# Patient Record
Sex: FEMALE | Race: WHITE | NOT HISPANIC OR LATINO | URBAN - METROPOLITAN AREA
[De-identification: names, ages, dates, MRNs, and addresses within clinical notes are randomized per-mention and may not be internally consistent; named-entity substitution may affect disease eponyms.]

---

## 2023-12-29 ENCOUNTER — INPATIENT (INPATIENT)
Facility: HOSPITAL | Age: 83
LOS: 5 days | Discharge: NURSING, REHAB OR LONG-TERM CARE | DRG: 556 | End: 2024-01-04
Attending: STUDENT IN AN ORGANIZED HEALTH CARE EDUCATION/TRAINING PROGRAM | Admitting: STUDENT IN AN ORGANIZED HEALTH CARE EDUCATION/TRAINING PROGRAM
Payer: MEDICARE

## 2023-12-29 VITALS
TEMPERATURE: 97 F | RESPIRATION RATE: 18 BRPM | SYSTOLIC BLOOD PRESSURE: 163 MMHG | DIASTOLIC BLOOD PRESSURE: 83 MMHG | HEART RATE: 88 BPM | WEIGHT: 147.93 LBS | OXYGEN SATURATION: 99 %

## 2023-12-29 DIAGNOSIS — Z29.9 ENCOUNTER FOR PROPHYLACTIC MEASURES, UNSPECIFIED: ICD-10-CM

## 2023-12-29 DIAGNOSIS — E78.5 HYPERLIPIDEMIA, UNSPECIFIED: ICD-10-CM

## 2023-12-29 DIAGNOSIS — R09.89 OTHER SPECIFIED SYMPTOMS AND SIGNS INVOLVING THE CIRCULATORY AND RESPIRATORY SYSTEMS: ICD-10-CM
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
## 2023-12-29 DIAGNOSIS — E03.9 HYPOTHYROIDISM, UNSPECIFIED: ICD-10-CM

## 2023-12-29 DIAGNOSIS — Z98.890 OTHER SPECIFIED POSTPROCEDURAL STATES: Chronic | ICD-10-CM

## 2023-12-29 DIAGNOSIS — I10 ESSENTIAL (PRIMARY) HYPERTENSION: ICD-10-CM

## 2023-12-29 DIAGNOSIS — R53.1 WEAKNESS: ICD-10-CM

## 2023-12-29 DIAGNOSIS — E11.9 TYPE 2 DIABETES MELLITUS WITHOUT COMPLICATIONS: ICD-10-CM

## 2023-12-29 LAB
ALBUMIN SERPL ELPH-MCNC: 3.8 G/DL — SIGNIFICANT CHANGE UP (ref 3.3–5)
ALBUMIN SERPL ELPH-MCNC: 3.8 G/DL — SIGNIFICANT CHANGE UP (ref 3.3–5)
ALP SERPL-CCNC: 94 U/L — SIGNIFICANT CHANGE UP (ref 40–120)
ALP SERPL-CCNC: 94 U/L — SIGNIFICANT CHANGE UP (ref 40–120)
ALT FLD-CCNC: 17 U/L — SIGNIFICANT CHANGE UP (ref 10–45)
ALT FLD-CCNC: 17 U/L — SIGNIFICANT CHANGE UP (ref 10–45)
ANION GAP SERPL CALC-SCNC: 13 MMOL/L — SIGNIFICANT CHANGE UP (ref 5–17)
ANION GAP SERPL CALC-SCNC: 13 MMOL/L — SIGNIFICANT CHANGE UP (ref 5–17)
APTT BLD: 29.6 SEC — SIGNIFICANT CHANGE UP (ref 24.5–35.6)
APTT BLD: 29.6 SEC — SIGNIFICANT CHANGE UP (ref 24.5–35.6)
AST SERPL-CCNC: 31 U/L — SIGNIFICANT CHANGE UP (ref 10–40)
AST SERPL-CCNC: 31 U/L — SIGNIFICANT CHANGE UP (ref 10–40)
BASOPHILS # BLD AUTO: 0.04 K/UL — SIGNIFICANT CHANGE UP (ref 0–0.2)
BASOPHILS # BLD AUTO: 0.04 K/UL — SIGNIFICANT CHANGE UP (ref 0–0.2)
BASOPHILS NFR BLD AUTO: 0.4 % — SIGNIFICANT CHANGE UP (ref 0–2)
BASOPHILS NFR BLD AUTO: 0.4 % — SIGNIFICANT CHANGE UP (ref 0–2)
BILIRUB SERPL-MCNC: 0.3 MG/DL — SIGNIFICANT CHANGE UP (ref 0.2–1.2)
BILIRUB SERPL-MCNC: 0.3 MG/DL — SIGNIFICANT CHANGE UP (ref 0.2–1.2)
BUN SERPL-MCNC: 24 MG/DL — HIGH (ref 7–23)
BUN SERPL-MCNC: 24 MG/DL — HIGH (ref 7–23)
CALCIUM SERPL-MCNC: 9.8 MG/DL — SIGNIFICANT CHANGE UP (ref 8.4–10.5)
CALCIUM SERPL-MCNC: 9.8 MG/DL — SIGNIFICANT CHANGE UP (ref 8.4–10.5)
CHLORIDE SERPL-SCNC: 106 MMOL/L — SIGNIFICANT CHANGE UP (ref 96–108)
CHLORIDE SERPL-SCNC: 106 MMOL/L — SIGNIFICANT CHANGE UP (ref 96–108)
CO2 SERPL-SCNC: 20 MMOL/L — LOW (ref 22–31)
CO2 SERPL-SCNC: 20 MMOL/L — LOW (ref 22–31)
CREAT SERPL-MCNC: 0.78 MG/DL — SIGNIFICANT CHANGE UP (ref 0.5–1.3)
CREAT SERPL-MCNC: 0.78 MG/DL — SIGNIFICANT CHANGE UP (ref 0.5–1.3)
EGFR: 76 ML/MIN/1.73M2 — SIGNIFICANT CHANGE UP
EGFR: 76 ML/MIN/1.73M2 — SIGNIFICANT CHANGE UP
EOSINOPHIL # BLD AUTO: 0.14 K/UL — SIGNIFICANT CHANGE UP (ref 0–0.5)
EOSINOPHIL # BLD AUTO: 0.14 K/UL — SIGNIFICANT CHANGE UP (ref 0–0.5)
EOSINOPHIL NFR BLD AUTO: 1.3 % — SIGNIFICANT CHANGE UP (ref 0–6)
EOSINOPHIL NFR BLD AUTO: 1.3 % — SIGNIFICANT CHANGE UP (ref 0–6)
GLUCOSE SERPL-MCNC: 138 MG/DL — HIGH (ref 70–99)
GLUCOSE SERPL-MCNC: 138 MG/DL — HIGH (ref 70–99)
HCT VFR BLD CALC: 35 % — SIGNIFICANT CHANGE UP (ref 34.5–45)
HCT VFR BLD CALC: 35 % — SIGNIFICANT CHANGE UP (ref 34.5–45)
HGB BLD-MCNC: 11.6 G/DL — SIGNIFICANT CHANGE UP (ref 11.5–15.5)
HGB BLD-MCNC: 11.6 G/DL — SIGNIFICANT CHANGE UP (ref 11.5–15.5)
IMM GRANULOCYTES NFR BLD AUTO: 0.6 % — SIGNIFICANT CHANGE UP (ref 0–0.9)
IMM GRANULOCYTES NFR BLD AUTO: 0.6 % — SIGNIFICANT CHANGE UP (ref 0–0.9)
INR BLD: 1.06 RATIO — SIGNIFICANT CHANGE UP (ref 0.85–1.18)
INR BLD: 1.06 RATIO — SIGNIFICANT CHANGE UP (ref 0.85–1.18)
LYMPHOCYTES # BLD AUTO: 1.35 K/UL — SIGNIFICANT CHANGE UP (ref 1–3.3)
LYMPHOCYTES # BLD AUTO: 1.35 K/UL — SIGNIFICANT CHANGE UP (ref 1–3.3)
LYMPHOCYTES # BLD AUTO: 12.9 % — LOW (ref 13–44)
LYMPHOCYTES # BLD AUTO: 12.9 % — LOW (ref 13–44)
MCHC RBC-ENTMCNC: 29.6 PG — SIGNIFICANT CHANGE UP (ref 27–34)
MCHC RBC-ENTMCNC: 29.6 PG — SIGNIFICANT CHANGE UP (ref 27–34)
MCHC RBC-ENTMCNC: 33.1 GM/DL — SIGNIFICANT CHANGE UP (ref 32–36)
MCHC RBC-ENTMCNC: 33.1 GM/DL — SIGNIFICANT CHANGE UP (ref 32–36)
MCV RBC AUTO: 89.3 FL — SIGNIFICANT CHANGE UP (ref 80–100)
MCV RBC AUTO: 89.3 FL — SIGNIFICANT CHANGE UP (ref 80–100)
MONOCYTES # BLD AUTO: 0.94 K/UL — HIGH (ref 0–0.9)
MONOCYTES # BLD AUTO: 0.94 K/UL — HIGH (ref 0–0.9)
MONOCYTES NFR BLD AUTO: 9 % — SIGNIFICANT CHANGE UP (ref 2–14)
MONOCYTES NFR BLD AUTO: 9 % — SIGNIFICANT CHANGE UP (ref 2–14)
NEUTROPHILS # BLD AUTO: 7.97 K/UL — HIGH (ref 1.8–7.4)
NEUTROPHILS # BLD AUTO: 7.97 K/UL — HIGH (ref 1.8–7.4)
NEUTROPHILS NFR BLD AUTO: 75.8 % — SIGNIFICANT CHANGE UP (ref 43–77)
NEUTROPHILS NFR BLD AUTO: 75.8 % — SIGNIFICANT CHANGE UP (ref 43–77)
NRBC # BLD: 0 /100 WBCS — SIGNIFICANT CHANGE UP (ref 0–0)
NRBC # BLD: 0 /100 WBCS — SIGNIFICANT CHANGE UP (ref 0–0)
PLATELET # BLD AUTO: 273 K/UL — SIGNIFICANT CHANGE UP (ref 150–400)
PLATELET # BLD AUTO: 273 K/UL — SIGNIFICANT CHANGE UP (ref 150–400)
POTASSIUM SERPL-MCNC: 4.6 MMOL/L — SIGNIFICANT CHANGE UP (ref 3.5–5.3)
POTASSIUM SERPL-MCNC: 4.6 MMOL/L — SIGNIFICANT CHANGE UP (ref 3.5–5.3)
POTASSIUM SERPL-SCNC: 4.6 MMOL/L — SIGNIFICANT CHANGE UP (ref 3.5–5.3)
POTASSIUM SERPL-SCNC: 4.6 MMOL/L — SIGNIFICANT CHANGE UP (ref 3.5–5.3)
PROT SERPL-MCNC: 7.2 G/DL — SIGNIFICANT CHANGE UP (ref 6–8.3)
PROT SERPL-MCNC: 7.2 G/DL — SIGNIFICANT CHANGE UP (ref 6–8.3)
PROTHROM AB SERPL-ACNC: 11.6 SEC — SIGNIFICANT CHANGE UP (ref 9.5–13)
PROTHROM AB SERPL-ACNC: 11.6 SEC — SIGNIFICANT CHANGE UP (ref 9.5–13)
RBC # BLD: 3.92 M/UL — SIGNIFICANT CHANGE UP (ref 3.8–5.2)
RBC # BLD: 3.92 M/UL — SIGNIFICANT CHANGE UP (ref 3.8–5.2)
RBC # FLD: 14.3 % — SIGNIFICANT CHANGE UP (ref 10.3–14.5)
RBC # FLD: 14.3 % — SIGNIFICANT CHANGE UP (ref 10.3–14.5)
SODIUM SERPL-SCNC: 139 MMOL/L — SIGNIFICANT CHANGE UP (ref 135–145)
SODIUM SERPL-SCNC: 139 MMOL/L — SIGNIFICANT CHANGE UP (ref 135–145)
TROPONIN T, HIGH SENSITIVITY RESULT: 30 NG/L — SIGNIFICANT CHANGE UP (ref 0–51)
TROPONIN T, HIGH SENSITIVITY RESULT: 30 NG/L — SIGNIFICANT CHANGE UP (ref 0–51)
TROPONIN T, HIGH SENSITIVITY RESULT: 37 NG/L — SIGNIFICANT CHANGE UP (ref 0–51)
TROPONIN T, HIGH SENSITIVITY RESULT: 37 NG/L — SIGNIFICANT CHANGE UP (ref 0–51)
TSH SERPL-MCNC: 5.64 UIU/ML — HIGH (ref 0.27–4.2)
TSH SERPL-MCNC: 5.64 UIU/ML — HIGH (ref 0.27–4.2)
WBC # BLD: 10.5 K/UL — SIGNIFICANT CHANGE UP (ref 3.8–10.5)
WBC # BLD: 10.5 K/UL — SIGNIFICANT CHANGE UP (ref 3.8–10.5)
WBC # FLD AUTO: 10.5 K/UL — SIGNIFICANT CHANGE UP (ref 3.8–10.5)
WBC # FLD AUTO: 10.5 K/UL — SIGNIFICANT CHANGE UP (ref 3.8–10.5)

## 2023-12-29 PROCEDURE — 70450 CT HEAD/BRAIN W/O DYE: CPT | Mod: 26,MA

## 2023-12-29 PROCEDURE — 99285 EMERGENCY DEPT VISIT HI MDM: CPT

## 2023-12-29 PROCEDURE — 72125 CT NECK SPINE W/O DYE: CPT | Mod: 26,MA

## 2023-12-29 PROCEDURE — 71045 X-RAY EXAM CHEST 1 VIEW: CPT | Mod: 26

## 2023-12-29 PROCEDURE — 72170 X-RAY EXAM OF PELVIS: CPT | Mod: 26

## 2023-12-29 PROCEDURE — 99223 1ST HOSP IP/OBS HIGH 75: CPT

## 2023-12-29 PROCEDURE — 73562 X-RAY EXAM OF KNEE 3: CPT | Mod: 26,50

## 2023-12-29 PROCEDURE — 93010 ELECTROCARDIOGRAM REPORT: CPT

## 2023-12-29 RX ORDER — INSULIN LISPRO 100/ML
VIAL (ML) SUBCUTANEOUS
Refills: 0 | Status: DISCONTINUED | OUTPATIENT
Start: 2023-12-29 | End: 2024-01-04

## 2023-12-29 RX ORDER — CLOPIDOGREL BISULFATE 75 MG/1
1 TABLET, FILM COATED ORAL
Refills: 0 | DISCHARGE

## 2023-12-29 RX ORDER — SODIUM CHLORIDE 9 MG/ML
1000 INJECTION, SOLUTION INTRAVENOUS
Refills: 0 | Status: DISCONTINUED | OUTPATIENT
Start: 2023-12-29 | End: 2024-01-04

## 2023-12-29 RX ORDER — INSULIN LISPRO 100/ML
VIAL (ML) SUBCUTANEOUS AT BEDTIME
Refills: 0 | Status: DISCONTINUED | OUTPATIENT
Start: 2023-12-29 | End: 2024-01-04

## 2023-12-29 RX ORDER — ACETAMINOPHEN 500 MG
650 TABLET ORAL EVERY 6 HOURS
Refills: 0 | Status: DISCONTINUED | OUTPATIENT
Start: 2023-12-29 | End: 2024-01-04

## 2023-12-29 RX ORDER — SODIUM CHLORIDE 9 MG/ML
1000 INJECTION INTRAMUSCULAR; INTRAVENOUS; SUBCUTANEOUS ONCE
Refills: 0 | Status: COMPLETED | OUTPATIENT
Start: 2023-12-29 | End: 2023-12-29

## 2023-12-29 RX ORDER — DEXTROSE 50 % IN WATER 50 %
25 SYRINGE (ML) INTRAVENOUS ONCE
Refills: 0 | Status: DISCONTINUED | OUTPATIENT
Start: 2023-12-29 | End: 2024-01-04

## 2023-12-29 RX ORDER — LANOLIN ALCOHOL/MO/W.PET/CERES
3 CREAM (GRAM) TOPICAL AT BEDTIME
Refills: 0 | Status: DISCONTINUED | OUTPATIENT
Start: 2023-12-29 | End: 2023-12-30

## 2023-12-29 RX ORDER — DEXTROSE 50 % IN WATER 50 %
15 SYRINGE (ML) INTRAVENOUS ONCE
Refills: 0 | Status: DISCONTINUED | OUTPATIENT
Start: 2023-12-29 | End: 2024-01-04

## 2023-12-29 RX ORDER — METFORMIN HYDROCHLORIDE 850 MG/1
1 TABLET ORAL
Refills: 0 | DISCHARGE

## 2023-12-29 RX ORDER — DEXTROSE 50 % IN WATER 50 %
12.5 SYRINGE (ML) INTRAVENOUS ONCE
Refills: 0 | Status: DISCONTINUED | OUTPATIENT
Start: 2023-12-29 | End: 2024-01-04

## 2023-12-29 RX ORDER — GLUCAGON INJECTION, SOLUTION 0.5 MG/.1ML
1 INJECTION, SOLUTION SUBCUTANEOUS ONCE
Refills: 0 | Status: DISCONTINUED | OUTPATIENT
Start: 2023-12-29 | End: 2024-01-04

## 2023-12-29 RX ADMIN — SODIUM CHLORIDE 1000 MILLILITER(S): 9 INJECTION INTRAMUSCULAR; INTRAVENOUS; SUBCUTANEOUS at 21:59

## 2023-12-29 NOTE — H&P ADULT - NSHPSOCIALHISTORY_GEN_ALL_CORE
Denies smoking or significant ETOH. Lives alone. Uses cane at home, has walker but rarely uses. Multiple nieces and friends in area, son lives in [place]

## 2023-12-29 NOTE — ED PROVIDER NOTE - OBJECTIVE STATEMENT
83-year-old female with past medical history of hypertension, diabetes, hyperlipidemia presents emerged department complaining of difficulty ambulating over the past week.  Patient notes that she has had trouble walking as well as getting up from a chair or bed.  She notes that this is not her baseline and typically can ambulate with assistance of a cane.  Last week she also had a fall reporting that she landed on her buttock area.  She endorses that yesterday also was lying in bed and she slipped hitting her head on a night stand.  She reports that she just feels generally more weak than her baseline. She endorses taking Plavix but is unsure why. Today also had dizziness as well.  She denies headaches, chest pain, shortness of breath, abdominal pain nausea or vomiting.

## 2023-12-29 NOTE — H&P ADULT - HISTORY OF PRESENT ILLNESS
83F w/ hx of DM2, HTN, HLD, OA p/w weakness and inability to ambulate. Pt endorses ~10 days ago she went out with niece for most of day and this was significantly more exertion than she was used to. Told niece not to call that night since they were together. At some point at dining table she tried to get up that night and fell to floor. Was on floor for almost 1 day as she was unable to  phone calls of niece and multiple friends. She was helped back up. After that patient had difficulty getting up when sitting in cough but able to stand when in chair. Has wooden armrest on couch and hit head on this as she was adjusting herself on couch which resulted in her forehead bruise. Nieces became concerned over the course of the week at pt's inability to get off couch. Had ultimatum that if patient did not get medical evaluation today they would call police to force her to hospital. When fire department came today to help, she could no longer stand from chair and realized she was deteriorating and needed to get evaluation. Denies any fevers, chills, cough, SOB, chest pain, palpitations, LOC, abdominal pain, dysuria or urinary frequency. Endorses chronic episodes of diarrhea but not recently. Not sure why she is on plavix but denies cardiac history.    In ER; Given NS 1L

## 2023-12-29 NOTE — ED PROVIDER NOTE - NS ED ATTENDING STATEMENT MOD
I have seen and examined this patient and fully participated in the care of this patient as the teaching attending.  The service was shared with the KENNEDI.  I reviewed and verified the documentation and independently performed the documented:

## 2023-12-29 NOTE — H&P ADULT - PROBLEM SELECTOR PLAN 6
-Cont. levothyroxine  -TSH slightly elevated, will order Free T3 and T4  -Need med rec [phone] [facility] -Cont. levothyroxine  -TSH slightly elevated, will order Free T3 and T4  -Need med rec [phone] [facility]

## 2023-12-29 NOTE — H&P ADULT - NSHPPHYSICALEXAM_GEN_ALL_CORE
Vital Signs Last 24 Hrs  T(C): 36.7 (12-29-23 @ 21:59), Max: 36.7 (12-29-23 @ 21:59)  T(F): 98 (12-29-23 @ 21:59), Max: 98 (12-29-23 @ 21:59)  HR: 80 (12-29-23 @ 21:59) (80 - 88)  BP: 130/80 (12-29-23 @ 21:59) (130/80 - 163/83)  BP(mean): --  RR: 16 (12-29-23 @ 21:59) (16 - 18)  SpO2: 99% (12-29-23 @ 21:59) (99% - 99%)

## 2023-12-29 NOTE — ED PROVIDER NOTE - PHYSICAL EXAMINATION
CONSTITUTIONAL: Patient is awake, alert and oriented x 3. Patient is well appearing and in no acute distress  HEAD: NCAT  NECK: Hematoma to the right forehead otherwise NCAT, neck supple w/ FROM  LUNGS: CTA b/l, no wheezing or rales. No ttp to the anterior, lateral or posterior chest wall   HEART: RRR.+S1S2 no murmurs  ABDOMEN: Soft, non-distended, nttp, no rebound or guarding  EXTREMITY: FROM upper and lower ext b/l. No focal bony ttp to the UE/LE b/l. Pelvis stable without bony ttp. No midline cervical, thoracic or lumbar ttp   SKIN: with no rash or lesions  NEURO: No focal deficits

## 2023-12-29 NOTE — ED ADULT NURSE NOTE - OBJECTIVE STATEMENT
82 yo f arrived to the ed c/o dizziness and difficulty ambulating x 1 week; had a fall 1 week ago; coming from home 84 yo f arrived to the ed c/o dizziness and difficulty ambulating x 1 week; had a fall 1 week ago; coming from home 83-year-old female with past medical history of hypertension, diabetes, hyperlipidemia presents emerged department complaining of difficulty ambulating over the past week.  Patient notes that she has had trouble walking as well as getting up from a chair or bed.  She notes that this is not her baseline and typically can ambulate with assistance of a cane.  Last week she also had a fall reporting that she landed on her buttock area.  She endorses that yesterday also was lying in bed and she slipped hitting her head on a night stand.  She reports that she just feels generally more weak than her baseline. She endorses taking Plavix but is unsure why. Today also had dizziness as well.  She denies headaches, chest pain, shortness of breath, abdominal pain nausea or vomiting.

## 2023-12-29 NOTE — H&P ADULT - ASSESSMENT
83F w/ hx of DM2, HTN, HLD, OA p/w weakness and inability to ambulate after fall at home 83F w/ hx of DM2, HTN, HLD, OA p/w weakness and inability to ambulate after fall at home likely with some degree of resolving rhabdo and possible deconditioning

## 2023-12-29 NOTE — H&P ADULT - PROBLEM SELECTOR PLAN 1
Patient with persistent weakness and difficulty standing/ambulating since fall and prolonged downtime. Possibly deconditioning but will eval for possible metabolic factors.  -Falls precautions  -PT consult  -Send U/A, UCx  -F/u T3, T4  -B12  -Given L knee effusion and plan for Lovenox for DVT prophylaxis, will order CT L knee to r/o hemarthrosis  -D/C planning to [facility] vs home

## 2023-12-29 NOTE — ED PROVIDER NOTE - IV ALTEPLASE EXCL ABS HIDDEN
Impression: S/P Cataract Extraction by phacoemulsification with IOL placement; ORA OS - 7 Days. Presence of intraocular lens  Z96.1. Excellent post op course   Post operative instructions reviewed - Condition is improving - Plan: Use AFT 1-4x daily OU
all restrictions lifted RTC 3wk PO3
show

## 2023-12-29 NOTE — H&P ADULT - PROBLEM SELECTOR PLAN 4
-Trend BP  -Pt on nebivolol?  -Need med rec [phone] [facility] -Trend BP  -Pt on nebivolol?  -Need med rec [phone] [facility] [place]

## 2023-12-29 NOTE — H&P ADULT - PROBLEM SELECTOR PLAN 2
Unclear etiology of fall >1 week ago. Seems to have vasovagal component vs mechanical. Pt denies cardiac hx but not clear why pt on plavix and nebivolol  -Falls precautions  -PT consult  -Orthostatics  -Repeat CPK in AM, if downtrending can likely stop trending  -TTE Unclear etiology of fall >1 week ago. Seems to have vasovagal component vs mechanical. Pt denies cardiac hx but not clear why pt on plavix and nebivolol. CTH w/o acute findings. Plain films w/o fracture. Note L knee effusion  -Falls precautions  -PT consult  -Orthostatics  -Repeat CPK in AM, if downtrending can likely stop trending  -TTE  -Cont. plavix for now  -Need med rec [phone] [facility] Unclear etiology of fall >1 week ago. Seems to have vasovagal component vs mechanical. Pt denies cardiac hx but not clear why pt on plavix and nebivolol. CTH w/o acute findings. Plain films w/o fracture. Note L knee effusion  -Falls precautions  -PT consult  -Orthostatics  -Repeat CPK in AM, if downtrending can likely stop trending  -TTE  -Cont. plavix for now  -Need med rec [phone] [facility]

## 2023-12-29 NOTE — H&P ADULT - NSHPLABSRESULTS_GEN_ALL_CORE
I have personally reviewed the labs, imaging and ekg. EKG with NSR HR82 QTc 471 non-specific ST segment findings, CXR w/o focal consolidations.

## 2023-12-29 NOTE — H&P ADULT - PROBLEM SELECTOR PLAN 5
-Cont. atorvastatin  -Need med rec [phone] [facility] -Cont. atorvastatin  -Need med rec [phone] [facility]

## 2023-12-29 NOTE — ED ADULT NURSE NOTE - NSFALLRISKINTERV_ED_ALL_ED
Assistance OOB with selected safe patient handling equipment if applicable/Assistance with ambulation/Communicate fall risk and risk factors to all staff, patient, and family/Encourage patient to sit up slowly, dangle for a short time, stand at bedside before walking/Monitor gait and stability/Orthostatic vital signs/Provide visual cue: yellow wristband, yellow gown, etc/Reinforce activity limits and safety measures with patient and family/Call bell, personal items and telephone in reach/Instruct patient to call for assistance before getting out of bed/chair/stretcher/Non-slip footwear applied when patient is off stretcher/[place] to call system/Physically safe environment - no spills, clutter or unnecessary equipment/Purposeful Proactive Rounding/Room/bathroom lighting operational, light cord in reach Assistance OOB with selected safe patient handling equipment if applicable/Assistance with ambulation/Communicate fall risk and risk factors to all staff, patient, and family/Encourage patient to sit up slowly, dangle for a short time, stand at bedside before walking/Monitor gait and stability/Orthostatic vital signs/Provide visual cue: yellow wristband, yellow gown, etc/Reinforce activity limits and safety measures with patient and family/Call bell, personal items and telephone in reach/Instruct patient to call for assistance before getting out of bed/chair/stretcher/Non-slip footwear applied when patient is off stretcher/[place] to call system/Physically safe environment - no spills, clutter or unnecessary equipment/Purposeful Proactive Rounding/Room/bathroom lighting operational, light cord in reach

## 2023-12-29 NOTE — H&P ADULT - TIME BILLING
Need to interview and examine patient,  provide counseling, coordinate care, place orders, document, personally review imaging, ekg and review prior medical records

## 2023-12-30 LAB
A1C WITH ESTIMATED AVERAGE GLUCOSE RESULT: 6.2 % — HIGH (ref 4–5.6)
A1C WITH ESTIMATED AVERAGE GLUCOSE RESULT: 6.2 % — HIGH (ref 4–5.6)
ALBUMIN SERPL ELPH-MCNC: 3.6 G/DL — SIGNIFICANT CHANGE UP (ref 3.3–5)
ALBUMIN SERPL ELPH-MCNC: 3.6 G/DL — SIGNIFICANT CHANGE UP (ref 3.3–5)
ALP SERPL-CCNC: 88 U/L — SIGNIFICANT CHANGE UP (ref 40–120)
ALP SERPL-CCNC: 88 U/L — SIGNIFICANT CHANGE UP (ref 40–120)
ALT FLD-CCNC: 12 U/L — SIGNIFICANT CHANGE UP (ref 10–45)
ALT FLD-CCNC: 12 U/L — SIGNIFICANT CHANGE UP (ref 10–45)
ANION GAP SERPL CALC-SCNC: 9 MMOL/L — SIGNIFICANT CHANGE UP (ref 5–17)
ANION GAP SERPL CALC-SCNC: 9 MMOL/L — SIGNIFICANT CHANGE UP (ref 5–17)
APPEARANCE UR: ABNORMAL
APPEARANCE UR: ABNORMAL
AST SERPL-CCNC: 18 U/L — SIGNIFICANT CHANGE UP (ref 10–40)
AST SERPL-CCNC: 18 U/L — SIGNIFICANT CHANGE UP (ref 10–40)
BACTERIA # UR AUTO: ABNORMAL /HPF
BACTERIA # UR AUTO: ABNORMAL /HPF
BASOPHILS # BLD AUTO: 0.04 K/UL — SIGNIFICANT CHANGE UP (ref 0–0.2)
BASOPHILS # BLD AUTO: 0.04 K/UL — SIGNIFICANT CHANGE UP (ref 0–0.2)
BASOPHILS NFR BLD AUTO: 0.6 % — SIGNIFICANT CHANGE UP (ref 0–2)
BASOPHILS NFR BLD AUTO: 0.6 % — SIGNIFICANT CHANGE UP (ref 0–2)
BILIRUB SERPL-MCNC: 0.4 MG/DL — SIGNIFICANT CHANGE UP (ref 0.2–1.2)
BILIRUB SERPL-MCNC: 0.4 MG/DL — SIGNIFICANT CHANGE UP (ref 0.2–1.2)
BILIRUB UR-MCNC: NEGATIVE — SIGNIFICANT CHANGE UP
BILIRUB UR-MCNC: NEGATIVE — SIGNIFICANT CHANGE UP
BUN SERPL-MCNC: 18 MG/DL — SIGNIFICANT CHANGE UP (ref 7–23)
BUN SERPL-MCNC: 18 MG/DL — SIGNIFICANT CHANGE UP (ref 7–23)
CALCIUM SERPL-MCNC: 9.1 MG/DL — SIGNIFICANT CHANGE UP (ref 8.4–10.5)
CALCIUM SERPL-MCNC: 9.1 MG/DL — SIGNIFICANT CHANGE UP (ref 8.4–10.5)
CAST: 1 /LPF — SIGNIFICANT CHANGE UP (ref 0–4)
CAST: 1 /LPF — SIGNIFICANT CHANGE UP (ref 0–4)
CHLORIDE SERPL-SCNC: 109 MMOL/L — HIGH (ref 96–108)
CHLORIDE SERPL-SCNC: 109 MMOL/L — HIGH (ref 96–108)
CK SERPL-CCNC: 747 U/L — HIGH (ref 25–170)
CK SERPL-CCNC: 747 U/L — HIGH (ref 25–170)
CO2 SERPL-SCNC: 24 MMOL/L — SIGNIFICANT CHANGE UP (ref 22–31)
CO2 SERPL-SCNC: 24 MMOL/L — SIGNIFICANT CHANGE UP (ref 22–31)
COLOR SPEC: YELLOW — SIGNIFICANT CHANGE UP
COLOR SPEC: YELLOW — SIGNIFICANT CHANGE UP
CREAT SERPL-MCNC: 0.94 MG/DL — SIGNIFICANT CHANGE UP (ref 0.5–1.3)
CREAT SERPL-MCNC: 0.94 MG/DL — SIGNIFICANT CHANGE UP (ref 0.5–1.3)
DIFF PNL FLD: NEGATIVE — SIGNIFICANT CHANGE UP
DIFF PNL FLD: NEGATIVE — SIGNIFICANT CHANGE UP
EGFR: 61 ML/MIN/1.73M2 — SIGNIFICANT CHANGE UP
EGFR: 61 ML/MIN/1.73M2 — SIGNIFICANT CHANGE UP
EOSINOPHIL # BLD AUTO: 0.39 K/UL — SIGNIFICANT CHANGE UP (ref 0–0.5)
EOSINOPHIL # BLD AUTO: 0.39 K/UL — SIGNIFICANT CHANGE UP (ref 0–0.5)
EOSINOPHIL NFR BLD AUTO: 5.4 % — SIGNIFICANT CHANGE UP (ref 0–6)
EOSINOPHIL NFR BLD AUTO: 5.4 % — SIGNIFICANT CHANGE UP (ref 0–6)
ESTIMATED AVERAGE GLUCOSE: 131 MG/DL — HIGH (ref 68–114)
ESTIMATED AVERAGE GLUCOSE: 131 MG/DL — HIGH (ref 68–114)
GLUCOSE BLDC GLUCOMTR-MCNC: 136 MG/DL — HIGH (ref 70–99)
GLUCOSE BLDC GLUCOMTR-MCNC: 136 MG/DL — HIGH (ref 70–99)
GLUCOSE BLDC GLUCOMTR-MCNC: 148 MG/DL — HIGH (ref 70–99)
GLUCOSE BLDC GLUCOMTR-MCNC: 148 MG/DL — HIGH (ref 70–99)
GLUCOSE BLDC GLUCOMTR-MCNC: 152 MG/DL — HIGH (ref 70–99)
GLUCOSE BLDC GLUCOMTR-MCNC: 152 MG/DL — HIGH (ref 70–99)
GLUCOSE BLDC GLUCOMTR-MCNC: 174 MG/DL — HIGH (ref 70–99)
GLUCOSE BLDC GLUCOMTR-MCNC: 174 MG/DL — HIGH (ref 70–99)
GLUCOSE BLDC GLUCOMTR-MCNC: 182 MG/DL — HIGH (ref 70–99)
GLUCOSE BLDC GLUCOMTR-MCNC: 182 MG/DL — HIGH (ref 70–99)
GLUCOSE SERPL-MCNC: 125 MG/DL — HIGH (ref 70–99)
GLUCOSE SERPL-MCNC: 125 MG/DL — HIGH (ref 70–99)
GLUCOSE UR QL: NEGATIVE MG/DL — SIGNIFICANT CHANGE UP
GLUCOSE UR QL: NEGATIVE MG/DL — SIGNIFICANT CHANGE UP
HCT VFR BLD CALC: 35.5 % — SIGNIFICANT CHANGE UP (ref 34.5–45)
HCT VFR BLD CALC: 35.5 % — SIGNIFICANT CHANGE UP (ref 34.5–45)
HGB BLD-MCNC: 11.5 G/DL — SIGNIFICANT CHANGE UP (ref 11.5–15.5)
HGB BLD-MCNC: 11.5 G/DL — SIGNIFICANT CHANGE UP (ref 11.5–15.5)
IMM GRANULOCYTES NFR BLD AUTO: 0.6 % — SIGNIFICANT CHANGE UP (ref 0–0.9)
IMM GRANULOCYTES NFR BLD AUTO: 0.6 % — SIGNIFICANT CHANGE UP (ref 0–0.9)
KETONES UR-MCNC: ABNORMAL MG/DL
KETONES UR-MCNC: ABNORMAL MG/DL
LEUKOCYTE ESTERASE UR-ACNC: ABNORMAL
LEUKOCYTE ESTERASE UR-ACNC: ABNORMAL
LYMPHOCYTES # BLD AUTO: 1.97 K/UL — SIGNIFICANT CHANGE UP (ref 1–3.3)
LYMPHOCYTES # BLD AUTO: 1.97 K/UL — SIGNIFICANT CHANGE UP (ref 1–3.3)
LYMPHOCYTES # BLD AUTO: 27.3 % — SIGNIFICANT CHANGE UP (ref 13–44)
LYMPHOCYTES # BLD AUTO: 27.3 % — SIGNIFICANT CHANGE UP (ref 13–44)
MAGNESIUM SERPL-MCNC: 2 MG/DL — SIGNIFICANT CHANGE UP (ref 1.6–2.6)
MAGNESIUM SERPL-MCNC: 2 MG/DL — SIGNIFICANT CHANGE UP (ref 1.6–2.6)
MCHC RBC-ENTMCNC: 29.5 PG — SIGNIFICANT CHANGE UP (ref 27–34)
MCHC RBC-ENTMCNC: 29.5 PG — SIGNIFICANT CHANGE UP (ref 27–34)
MCHC RBC-ENTMCNC: 32.4 GM/DL — SIGNIFICANT CHANGE UP (ref 32–36)
MCHC RBC-ENTMCNC: 32.4 GM/DL — SIGNIFICANT CHANGE UP (ref 32–36)
MCV RBC AUTO: 91 FL — SIGNIFICANT CHANGE UP (ref 80–100)
MCV RBC AUTO: 91 FL — SIGNIFICANT CHANGE UP (ref 80–100)
MONOCYTES # BLD AUTO: 0.78 K/UL — SIGNIFICANT CHANGE UP (ref 0–0.9)
MONOCYTES # BLD AUTO: 0.78 K/UL — SIGNIFICANT CHANGE UP (ref 0–0.9)
MONOCYTES NFR BLD AUTO: 10.8 % — SIGNIFICANT CHANGE UP (ref 2–14)
MONOCYTES NFR BLD AUTO: 10.8 % — SIGNIFICANT CHANGE UP (ref 2–14)
NEUTROPHILS # BLD AUTO: 3.99 K/UL — SIGNIFICANT CHANGE UP (ref 1.8–7.4)
NEUTROPHILS # BLD AUTO: 3.99 K/UL — SIGNIFICANT CHANGE UP (ref 1.8–7.4)
NEUTROPHILS NFR BLD AUTO: 55.3 % — SIGNIFICANT CHANGE UP (ref 43–77)
NEUTROPHILS NFR BLD AUTO: 55.3 % — SIGNIFICANT CHANGE UP (ref 43–77)
NITRITE UR-MCNC: NEGATIVE — SIGNIFICANT CHANGE UP
NITRITE UR-MCNC: NEGATIVE — SIGNIFICANT CHANGE UP
NRBC # BLD: 0 /100 WBCS — SIGNIFICANT CHANGE UP (ref 0–0)
NRBC # BLD: 0 /100 WBCS — SIGNIFICANT CHANGE UP (ref 0–0)
PH UR: 5.5 — SIGNIFICANT CHANGE UP (ref 5–8)
PH UR: 5.5 — SIGNIFICANT CHANGE UP (ref 5–8)
PLATELET # BLD AUTO: 269 K/UL — SIGNIFICANT CHANGE UP (ref 150–400)
PLATELET # BLD AUTO: 269 K/UL — SIGNIFICANT CHANGE UP (ref 150–400)
POTASSIUM SERPL-MCNC: 3.6 MMOL/L — SIGNIFICANT CHANGE UP (ref 3.5–5.3)
POTASSIUM SERPL-MCNC: 3.6 MMOL/L — SIGNIFICANT CHANGE UP (ref 3.5–5.3)
POTASSIUM SERPL-SCNC: 3.6 MMOL/L — SIGNIFICANT CHANGE UP (ref 3.5–5.3)
POTASSIUM SERPL-SCNC: 3.6 MMOL/L — SIGNIFICANT CHANGE UP (ref 3.5–5.3)
PROCALCITONIN SERPL-MCNC: 0.11 NG/ML — HIGH (ref 0.02–0.1)
PROCALCITONIN SERPL-MCNC: 0.11 NG/ML — HIGH (ref 0.02–0.1)
PROT SERPL-MCNC: 6.4 G/DL — SIGNIFICANT CHANGE UP (ref 6–8.3)
PROT SERPL-MCNC: 6.4 G/DL — SIGNIFICANT CHANGE UP (ref 6–8.3)
PROT UR-MCNC: NEGATIVE MG/DL — SIGNIFICANT CHANGE UP
PROT UR-MCNC: NEGATIVE MG/DL — SIGNIFICANT CHANGE UP
RBC # BLD: 3.9 M/UL — SIGNIFICANT CHANGE UP (ref 3.8–5.2)
RBC # BLD: 3.9 M/UL — SIGNIFICANT CHANGE UP (ref 3.8–5.2)
RBC # FLD: 14.5 % — SIGNIFICANT CHANGE UP (ref 10.3–14.5)
RBC # FLD: 14.5 % — SIGNIFICANT CHANGE UP (ref 10.3–14.5)
RBC CASTS # UR COMP ASSIST: 2 /HPF — SIGNIFICANT CHANGE UP (ref 0–4)
RBC CASTS # UR COMP ASSIST: 2 /HPF — SIGNIFICANT CHANGE UP (ref 0–4)
SODIUM SERPL-SCNC: 142 MMOL/L — SIGNIFICANT CHANGE UP (ref 135–145)
SODIUM SERPL-SCNC: 142 MMOL/L — SIGNIFICANT CHANGE UP (ref 135–145)
SP GR SPEC: 1.01 — SIGNIFICANT CHANGE UP (ref 1–1.03)
SP GR SPEC: 1.01 — SIGNIFICANT CHANGE UP (ref 1–1.03)
SQUAMOUS # UR AUTO: 4 /HPF — SIGNIFICANT CHANGE UP (ref 0–5)
SQUAMOUS # UR AUTO: 4 /HPF — SIGNIFICANT CHANGE UP (ref 0–5)
T3FREE SERPL-MCNC: 2.12 PG/ML — SIGNIFICANT CHANGE UP (ref 2–4.4)
T3FREE SERPL-MCNC: 2.12 PG/ML — SIGNIFICANT CHANGE UP (ref 2–4.4)
T4 FREE SERPL-MCNC: 1.2 NG/DL — SIGNIFICANT CHANGE UP (ref 0.9–1.8)
T4 FREE SERPL-MCNC: 1.2 NG/DL — SIGNIFICANT CHANGE UP (ref 0.9–1.8)
UROBILINOGEN FLD QL: 0.2 MG/DL — SIGNIFICANT CHANGE UP (ref 0.2–1)
UROBILINOGEN FLD QL: 0.2 MG/DL — SIGNIFICANT CHANGE UP (ref 0.2–1)
VIT B12 SERPL-MCNC: 1705 PG/ML — HIGH (ref 232–1245)
VIT B12 SERPL-MCNC: 1705 PG/ML — HIGH (ref 232–1245)
WBC # BLD: 7.21 K/UL — SIGNIFICANT CHANGE UP (ref 3.8–10.5)
WBC # BLD: 7.21 K/UL — SIGNIFICANT CHANGE UP (ref 3.8–10.5)
WBC # FLD AUTO: 7.21 K/UL — SIGNIFICANT CHANGE UP (ref 3.8–10.5)
WBC # FLD AUTO: 7.21 K/UL — SIGNIFICANT CHANGE UP (ref 3.8–10.5)
WBC UR QL: 39 /HPF — HIGH (ref 0–5)
WBC UR QL: 39 /HPF — HIGH (ref 0–5)

## 2023-12-30 PROCEDURE — 99233 SBSQ HOSP IP/OBS HIGH 50: CPT

## 2023-12-30 RX ORDER — ATORVASTATIN CALCIUM 80 MG/1
10 TABLET, FILM COATED ORAL AT BEDTIME
Refills: 0 | Status: DISCONTINUED | OUTPATIENT
Start: 2023-12-30 | End: 2024-01-04

## 2023-12-30 RX ORDER — LEVOTHYROXINE SODIUM 125 MCG
50 TABLET ORAL DAILY
Refills: 0 | Status: DISCONTINUED | OUTPATIENT
Start: 2023-12-30 | End: 2024-01-04

## 2023-12-30 RX ORDER — ENOXAPARIN SODIUM 100 MG/ML
40 INJECTION SUBCUTANEOUS EVERY 24 HOURS
Refills: 0 | Status: DISCONTINUED | OUTPATIENT
Start: 2023-12-30 | End: 2024-01-04

## 2023-12-30 RX ORDER — LANOLIN ALCOHOL/MO/W.PET/CERES
5 CREAM (GRAM) TOPICAL AT BEDTIME
Refills: 0 | Status: DISCONTINUED | OUTPATIENT
Start: 2023-12-30 | End: 2024-01-04

## 2023-12-30 RX ORDER — LEVOTHYROXINE SODIUM 125 MCG
1 TABLET ORAL
Refills: 0 | DISCHARGE

## 2023-12-30 RX ORDER — LINAGLIPTIN 5 MG/1
1 TABLET, FILM COATED ORAL
Refills: 0 | DISCHARGE

## 2023-12-30 RX ORDER — ATORVASTATIN CALCIUM 80 MG/1
1 TABLET, FILM COATED ORAL
Refills: 0 | DISCHARGE

## 2023-12-30 RX ORDER — METOPROLOL TARTRATE 50 MG
12.5 TABLET ORAL
Refills: 0 | Status: DISCONTINUED | OUTPATIENT
Start: 2023-12-30 | End: 2024-01-04

## 2023-12-30 RX ORDER — CLOPIDOGREL BISULFATE 75 MG/1
75 TABLET, FILM COATED ORAL DAILY
Refills: 0 | Status: DISCONTINUED | OUTPATIENT
Start: 2023-12-30 | End: 2024-01-04

## 2023-12-30 RX ADMIN — Medication 12.5 MILLIGRAM(S): at 17:31

## 2023-12-30 RX ADMIN — ATORVASTATIN CALCIUM 10 MILLIGRAM(S): 80 TABLET, FILM COATED ORAL at 21:55

## 2023-12-30 RX ADMIN — ENOXAPARIN SODIUM 40 MILLIGRAM(S): 100 INJECTION SUBCUTANEOUS at 12:36

## 2023-12-30 RX ADMIN — Medication 1: at 12:35

## 2023-12-30 RX ADMIN — Medication 1: at 17:30

## 2023-12-30 NOTE — PHYSICAL THERAPY INITIAL EVALUATION ADULT - PERTINENT HX OF CURRENT PROBLEM, REHAB EVAL
83F w/ hx of DM2, HTN, HLD, OA p/w weakness and inability to ambulate after fall at home likely with some degree of resolving rhabdo and possible deconditioning.     CXR (12/29): No acute radiographic traumatic findings.  XRay Pelvis/hips/knees (12/29): 1.  No acute displaced fracture. 2.  At least moderate bilateral hip arthrosis. 3.  Advanced bilateral knee arthrosis with effusion on the left side.  CT Head (12/29): No acute intracranial injury. Microvascular disease. No acute fractures are seen.  CT Cervical Spine (12/29): No acute fracture or traumatic malalignment. Degenerative changes with notable canal and foraminal stenosis C5/C6 and C6/C7.

## 2023-12-30 NOTE — CHART NOTE - NSCHARTNOTEFT_GEN_A_CORE
Patient's home [facility]  faxed med rec to [address]. Home medications as follows:    Metformin  mg tab BID  Atorvastatin 10 mg tab daily  Levothyroxine 50 mcg tab daily  Tradjenta 5 mg tab daily  Clopidogrel 75 mg tab daily  Nebivolol 5 mg tab daily

## 2023-12-30 NOTE — PATIENT PROFILE ADULT - FALL HARM RISK - HARM RISK INTERVENTIONS
Assistance with ambulation/Assistance OOB with selected safe patient handling equipment/Communicate Risk of Fall with Harm to all staff/Discuss with provider need for PT consult/Monitor gait and stability/Provide patient with walking aids - walker, cane, crutches/Reinforce activity limits and safety measures with patient and family/Tailored Fall Risk Interventions/Visual Cue: Yellow wristband and red socks/Bed in lowest position, wheels locked, appropriate side rails in place/Call bell, personal items and telephone in reach/Instruct patient to call for assistance before getting out of bed or chair/Non-slip footwear when patient is out of bed/[place] to call system/Physically safe environment - no spills, clutter or unnecessary equipment/Purposeful Proactive Rounding/Room/bathroom lighting operational, light cord in reach Assistance with ambulation/Assistance OOB with selected safe patient handling equipment/Communicate Risk of Fall with Harm to all staff/Discuss with provider need for PT consult/Monitor gait and stability/Provide patient with walking aids - walker, cane, crutches/Reinforce activity limits and safety measures with patient and family/Tailored Fall Risk Interventions/Visual Cue: Yellow wristband and red socks/Bed in lowest position, wheels locked, appropriate side rails in place/Call bell, personal items and telephone in reach/Instruct patient to call for assistance before getting out of bed or chair/Non-slip footwear when patient is out of bed/[place] to call system/Physically safe environment - no spills, clutter or unnecessary equipment/Purposeful Proactive Rounding/Room/bathroom lighting operational, light cord in reach

## 2023-12-30 NOTE — PATIENT PROFILE ADULT - STAGE
suspected deep tissue injury 87YO F hx HTN p/w multiple episodes of disorientation. LKN 8pm. first episode lasted for 20m, later episode for 7m while in route to ED. pt has had hx of TIA in the past, has had normal MRI months prior, not taking asa, plavix. denies recent fevers, n/v, abdominal pain, diarrhea, dysuria. denies other focal neuro changes.

## 2023-12-30 NOTE — PROGRESS NOTE ADULT - PROBLEM SELECTOR PLAN 6
-Cont. levothyroxine  -TSH slightly elevated, will order Free T3 and T4 - testing  -Need [facility] rec [phone] [facility] -Cont. levothyroxine  -TSH slightly elevated, will order Free T3 and T4 - testing  -Need [facility] rec [phone] [facility]

## 2023-12-30 NOTE — PROGRESS NOTE ADULT - PROBLEM SELECTOR PLAN 2
Unclear etiology of fall >1 week ago. Seems to have vasovagal component vs mechanical. Pt denies cardiac hx but not clear why pt on plavix and nebivolol - except upon further review she reports having a cardiac w/u about 10 years ago and her pmd wanted to give ASA but she is intolerant so he gave plavix, which is likely for some degree of underlying CAD. No stroke hx. CTH w/o acute findings. Plain films w/o fracture. Note L knee effusion  -Falls precautions  -PT consult  -Orthostatics  -Repeat CPK in AM, if downtrending can likely stop trending -downtrending. -S/p 1L NS.   -TTE  -Cont. plavix for now  -Need med rec [phone] [facility]  -Vitamin D level. -takes at home.  -patient takes: plavix, atorvastatin, metformin, tradjenta, and synthroid. Unclear etiology of fall >1 week ago. Seems to have vasovagal component vs mechanical. Pt denies cardiac hx but not clear why pt on plavix and nebivolol - except upon further review she reports having a cardiac w/u about 10 years ago and her pmd wanted to give ASA but she is intolerant so he gave plavix, which is likely for some degree of underlying CAD. No stroke hx. CTH w/o acute findings. Plain films w/o fracture. Note L knee effusion  -Falls precautions  -PT consult  -Orthostatics  -Repeat CPK in AM, if downtrending can likely stop trending -downtrending. -S/p 1L NS.   -TTE  -Cont. plavix for now  -Need med rec [phone] [facility]  -Vitamin D level. -takes at home.  -patient takes: plavix, atorvastatin, metformin, tradjenta, and synthroid.

## 2023-12-30 NOTE — PROGRESS NOTE ADULT - PROBLEM SELECTOR PLAN 4
-Trend BP  -Pt on nebivolol? . ?CAD hx per hx  -Need med rec [phone] [facility] -Trend BP  -Pt on nebivolol? . ?CAD hx per hx  -Need med rec [phone] [facility]

## 2023-12-30 NOTE — PROGRESS NOTE ADULT - PROBLEM SELECTOR PLAN 1
Patient with persistent weakness and difficulty standing/ambulating since fall and prolonged downtime. Possibly deconditioning but will eval for possible metabolic factors.  -Falls precautions  -PT consult  -U/A - positive but not symptomatic, UCx - testing. -f/u pct. if positive, consider abx.   -F/u T3, T4 - testing  -B12 -testing  -Given L knee effusion and plan for Lovenox for DVT prophylaxis, will order CT L knee to r/o hemarthrosis -pending.   -D/C planning to [facility] vs home Patient with persistent weakness and difficulty standing/ambulating since fall and prolonged downtime. Possibly deconditioning but will eval for possible metabolic factors.  -Falls precautions  -PT consult  -U/A - positive but not symptomatic, UCx - testing. -f/u pct. if positive, consider abx.   -F/u T3, T4 - testing  -B12 -testing  -Given L knee effusion and plan for Lovenox for DVT prophylaxis, will order CT L knee to r/o hemarthrosis -pending.   -D/C planning to [facility] vs home

## 2023-12-30 NOTE — PHYSICAL THERAPY INITIAL EVALUATION ADULT - NSPTDISCHREC_GEN_A_CORE
IF pt returns home, will require assist with ALL functional mobility and HomePT services. DME: Patient will require a rolling walker due to their diagnosis of decreased strength, endurance and balance to help complete MRADL's (mobility related aides for daily living)./Sub-acute Rehab

## 2023-12-30 NOTE — PHYSICAL THERAPY INITIAL EVALUATION ADULT - GAIT DEVIATIONS NOTED, PT EVAL
decreased vale/increased time in double stance/decreased step length/decreased stride length/decreased weight-shifting ability

## 2023-12-30 NOTE — PROGRESS NOTE ADULT - PROBLEM SELECTOR PLAN 5
-Cont. atorvastatin  -Need med rec [phone] [facility] -Cont. atorvastatin  -Need med rec [phone] [facility]

## 2023-12-30 NOTE — PHYSICAL THERAPY INITIAL EVALUATION ADULT - ACTIVE RANGE OF MOTION EXAMINATION, REHAB EVAL
bilateral upper extremity Active ROM was WFL (within functional limits)/bilateral  lower extremity Active ROM was WFL (within functional limits) except R shld flx AROM ~60 degrees (pt reports chronic)/bilateral upper extremity Active ROM was WFL (within functional limits)/bilateral  lower extremity Active ROM was WFL (within functional limits)

## 2023-12-30 NOTE — PROGRESS NOTE ADULT - SUBJECTIVE AND OBJECTIVE BOX
Patient is a 83y old  Female who presents with a chief complaint of Lethargy, weakness (30 Dec 2023 10:45)        SUBJECTIVE / OVERNIGHT EVENTS: patient rreports some left knee pain. no HA. still feels a bit weak. Mild left shoulder pain.       MEDICATIONS  (STANDING):  dextrose 5%. 1000 milliLiter(s) (50 mL/Hr) IV Continuous <Continuous>  dextrose 5%. 1000 milliLiter(s) (100 mL/Hr) IV Continuous <Continuous>  dextrose 50% Injectable 25 Gram(s) IV Push once  dextrose 50% Injectable 25 Gram(s) IV Push once  dextrose 50% Injectable 12.5 Gram(s) IV Push once  glucagon  Injectable 1 milliGRAM(s) IntraMuscular once  insulin lispro (ADMELOG) corrective regimen sliding scale   SubCutaneous three times a day before meals  insulin lispro (ADMELOG) corrective regimen sliding scale   SubCutaneous at bedtime  melatonin 5 milliGRAM(s) Oral at bedtime    MEDICATIONS  (PRN):  acetaminophen     Tablet .. 650 milliGRAM(s) Oral every 6 hours PRN Temp greater or equal to 38C (100.4F), Mild Pain (1 - 3)  dextrose Oral Gel 15 Gram(s) Oral once PRN Blood Glucose LESS THAN 70 milliGRAM(s)/deciliter      Vital Signs Last 24 Hrs  T(C): 36.6 (30 Dec 2023 04:44), Max: 36.7 (29 Dec 2023 21:59)  T(F): 97.9 (30 Dec 2023 04:44), Max: 98 (29 Dec 2023 21:59)  HR: 82 (30 Dec 2023 04:44) (80 - 90)  BP: 128/72 (30 Dec 2023 04:44) (128/72 - 163/83)  BP(mean): --  RR: 17 (30 Dec 2023 04:44) (16 - 18)  SpO2: 98% (30 Dec 2023 04:44) (98% - 99%)    Parameters below as of 30 Dec 2023 04:44  Patient On (Oxygen Delivery Method): room air      CAPILLARY BLOOD GLUCOSE      POCT Blood Glucose.: 148 mg/dL (30 Dec 2023 08:23)  POCT Blood Glucose.: 136 mg/dL (30 Dec 2023 00:27)  POCT Blood Glucose.: 163 mg/dL (29 Dec 2023 15:39)    I&O's Summary    29 Dec 2023 07:01  -  30 Dec 2023 07:00  --------------------------------------------------------  IN: 120 mL / OUT: 650 mL / NET: -530 mL    30 Dec 2023 07:01  -  30 Dec 2023 10:46  --------------------------------------------------------  IN: 360 mL / OUT: 0 mL / NET: 360 mL          PHYSICAL EXAM:   GENERAL: NAD, well-developed  HEAD:  right forehead bruise  EYES:   conjunctiva and sclera clear  NECK: Supple   CHEST/LUNG: Clear to auscultation bilaterally; No wheeze  HEART: S1S2 normal. Regular rate and rhythm; No murmurs, rubs, or gallops  ABDOMEN: Soft, Nontender, Nondistended; Bowel sounds present  EXTREMITIES:   No clubbing, cyanosis, or edema - left knee tender more lateral and slight swelling  PSYCH/Neuro: AAOx3. Non-focal.   SKIN: No rashes or lesions      LABS:                        11.5   7.21  )-----------( 269      ( 30 Dec 2023 07:05 )             35.5     12-30    142  |  109<H>  |  18  ----------------------------<  125<H>  3.6   |  24  |  0.94    Ca    9.1      30 Dec 2023 07:05  Mg     2.0     12-30    TPro  6.4  /  Alb  3.6  /  TBili  0.4  /  DBili  x   /  AST  18  /  ALT  12  /  AlkPhos  88  12-30    PT/INR - ( 29 Dec 2023 18:10 )   PT: 11.6 sec;   INR: 1.06 ratio         PTT - ( 29 Dec 2023 18:10 )  PTT:29.6 sec  CARDIAC MARKERS ( 30 Dec 2023 07:05 )  x     / x     / 747 U/L / x     / x      CARDIAC MARKERS ( 29 Dec 2023 16:38 )  x     / x     / 888 U/L / x     / x          Urinalysis Basic - ( 30 Dec 2023 07:05 )    Color: x / Appearance: x / SG: x / pH: x  Gluc: 125 mg/dL / Ketone: x  / Bili: x / Urobili: x   Blood: x / Protein: x / Nitrite: x   Leuk Esterase: x / RBC: x / WBC x   Sq Epi: x / Non Sq Epi: x / Bacteria: x      < from: Xray Knee 3 Views, Bilateral (12.29.23 @ 17:18) >  IMPRESSION:  1.  No acute displaced fracture.  2.  At least moderate bilateral hip arthrosis.  3.  Advanced bilateral knee arthrosis with effusion on the left side.    --- End of Report ---    < end of copied text >  < from: Xray Chest 1 View AP/PA (12.29.23 @ 17:17) >    INTERPRETATION:  No acute radiographic traumatic findings.    < end of copied text >  < from: CT Cervical Spine No Cont (12.29.23 @ 21:05) >  IMPRESSION:  No acute fracture or traumatic malalignment.  Degenerative changes with notable canal and foraminal stenosis C5/C6 and   C6/C7.    --- End of Report ---    < end of copied text >      RADIOLOGY & ADDITIONAL TESTS:    Imaging Personally Reviewed: xrays and CT  Consultant(s) Notes Reviewed:    Care Discussed with Consultants/Other Providers:

## 2023-12-30 NOTE — PHYSICAL THERAPY INITIAL EVALUATION ADULT - ADDITIONAL COMMENTS
Pt lives alone in an apt with 5 stairs to enter (+HR) and then on first floor. Reports being independent with functional mobility/ADLs with cane until fall on 12/21/2023. Since fall, pt reports she is unable to stand up on her own and fearful of falling again. Owns cane, and raised toilet seat

## 2023-12-31 LAB
24R-OH-CALCIDIOL SERPL-MCNC: 22.1 NG/ML — LOW (ref 30–80)
24R-OH-CALCIDIOL SERPL-MCNC: 22.1 NG/ML — LOW (ref 30–80)
ANION GAP SERPL CALC-SCNC: 10 MMOL/L — SIGNIFICANT CHANGE UP (ref 5–17)
ANION GAP SERPL CALC-SCNC: 10 MMOL/L — SIGNIFICANT CHANGE UP (ref 5–17)
BUN SERPL-MCNC: 28 MG/DL — HIGH (ref 7–23)
BUN SERPL-MCNC: 28 MG/DL — HIGH (ref 7–23)
CALCIUM SERPL-MCNC: 9 MG/DL — SIGNIFICANT CHANGE UP (ref 8.4–10.5)
CALCIUM SERPL-MCNC: 9 MG/DL — SIGNIFICANT CHANGE UP (ref 8.4–10.5)
CHLORIDE SERPL-SCNC: 108 MMOL/L — SIGNIFICANT CHANGE UP (ref 96–108)
CHLORIDE SERPL-SCNC: 108 MMOL/L — SIGNIFICANT CHANGE UP (ref 96–108)
CK SERPL-CCNC: 656 U/L — HIGH (ref 25–170)
CK SERPL-CCNC: 656 U/L — HIGH (ref 25–170)
CO2 SERPL-SCNC: 22 MMOL/L — SIGNIFICANT CHANGE UP (ref 22–31)
CO2 SERPL-SCNC: 22 MMOL/L — SIGNIFICANT CHANGE UP (ref 22–31)
CREAT SERPL-MCNC: 1.22 MG/DL — SIGNIFICANT CHANGE UP (ref 0.5–1.3)
CREAT SERPL-MCNC: 1.22 MG/DL — SIGNIFICANT CHANGE UP (ref 0.5–1.3)
EGFR: 44 ML/MIN/1.73M2 — LOW
EGFR: 44 ML/MIN/1.73M2 — LOW
FERRITIN SERPL-MCNC: 70 NG/ML — SIGNIFICANT CHANGE UP (ref 13–330)
FERRITIN SERPL-MCNC: 70 NG/ML — SIGNIFICANT CHANGE UP (ref 13–330)
FOLATE SERPL-MCNC: 10.1 NG/ML — SIGNIFICANT CHANGE UP
FOLATE SERPL-MCNC: 10.1 NG/ML — SIGNIFICANT CHANGE UP
GLUCOSE BLDC GLUCOMTR-MCNC: 120 MG/DL — HIGH (ref 70–99)
GLUCOSE BLDC GLUCOMTR-MCNC: 120 MG/DL — HIGH (ref 70–99)
GLUCOSE BLDC GLUCOMTR-MCNC: 122 MG/DL — HIGH (ref 70–99)
GLUCOSE BLDC GLUCOMTR-MCNC: 122 MG/DL — HIGH (ref 70–99)
GLUCOSE BLDC GLUCOMTR-MCNC: 138 MG/DL — HIGH (ref 70–99)
GLUCOSE BLDC GLUCOMTR-MCNC: 138 MG/DL — HIGH (ref 70–99)
GLUCOSE BLDC GLUCOMTR-MCNC: 145 MG/DL — HIGH (ref 70–99)
GLUCOSE BLDC GLUCOMTR-MCNC: 145 MG/DL — HIGH (ref 70–99)
GLUCOSE SERPL-MCNC: 121 MG/DL — HIGH (ref 70–99)
GLUCOSE SERPL-MCNC: 121 MG/DL — HIGH (ref 70–99)
HCT VFR BLD CALC: 32.1 % — LOW (ref 34.5–45)
HCT VFR BLD CALC: 32.1 % — LOW (ref 34.5–45)
HGB BLD-MCNC: 10.7 G/DL — LOW (ref 11.5–15.5)
HGB BLD-MCNC: 10.7 G/DL — LOW (ref 11.5–15.5)
IRON SATN MFR SERPL: 13 % — LOW (ref 14–50)
IRON SATN MFR SERPL: 13 % — LOW (ref 14–50)
IRON SATN MFR SERPL: 35 UG/DL — SIGNIFICANT CHANGE UP (ref 30–160)
IRON SATN MFR SERPL: 35 UG/DL — SIGNIFICANT CHANGE UP (ref 30–160)
MCHC RBC-ENTMCNC: 29.9 PG — SIGNIFICANT CHANGE UP (ref 27–34)
MCHC RBC-ENTMCNC: 29.9 PG — SIGNIFICANT CHANGE UP (ref 27–34)
MCHC RBC-ENTMCNC: 33.3 GM/DL — SIGNIFICANT CHANGE UP (ref 32–36)
MCHC RBC-ENTMCNC: 33.3 GM/DL — SIGNIFICANT CHANGE UP (ref 32–36)
MCV RBC AUTO: 89.7 FL — SIGNIFICANT CHANGE UP (ref 80–100)
MCV RBC AUTO: 89.7 FL — SIGNIFICANT CHANGE UP (ref 80–100)
NRBC # BLD: 0 /100 WBCS — SIGNIFICANT CHANGE UP (ref 0–0)
NRBC # BLD: 0 /100 WBCS — SIGNIFICANT CHANGE UP (ref 0–0)
NT-PROBNP SERPL-SCNC: 635 PG/ML — HIGH (ref 0–300)
NT-PROBNP SERPL-SCNC: 635 PG/ML — HIGH (ref 0–300)
PLATELET # BLD AUTO: 237 K/UL — SIGNIFICANT CHANGE UP (ref 150–400)
PLATELET # BLD AUTO: 237 K/UL — SIGNIFICANT CHANGE UP (ref 150–400)
POTASSIUM SERPL-MCNC: 3.6 MMOL/L — SIGNIFICANT CHANGE UP (ref 3.5–5.3)
POTASSIUM SERPL-MCNC: 3.6 MMOL/L — SIGNIFICANT CHANGE UP (ref 3.5–5.3)
POTASSIUM SERPL-SCNC: 3.6 MMOL/L — SIGNIFICANT CHANGE UP (ref 3.5–5.3)
POTASSIUM SERPL-SCNC: 3.6 MMOL/L — SIGNIFICANT CHANGE UP (ref 3.5–5.3)
RBC # BLD: 3.58 M/UL — LOW (ref 3.8–5.2)
RBC # BLD: 3.58 M/UL — LOW (ref 3.8–5.2)
RBC # FLD: 14.4 % — SIGNIFICANT CHANGE UP (ref 10.3–14.5)
RBC # FLD: 14.4 % — SIGNIFICANT CHANGE UP (ref 10.3–14.5)
SODIUM SERPL-SCNC: 140 MMOL/L — SIGNIFICANT CHANGE UP (ref 135–145)
SODIUM SERPL-SCNC: 140 MMOL/L — SIGNIFICANT CHANGE UP (ref 135–145)
TIBC SERPL-MCNC: 266 UG/DL — SIGNIFICANT CHANGE UP (ref 220–430)
TIBC SERPL-MCNC: 266 UG/DL — SIGNIFICANT CHANGE UP (ref 220–430)
UIBC SERPL-MCNC: 231 UG/DL — SIGNIFICANT CHANGE UP (ref 110–370)
UIBC SERPL-MCNC: 231 UG/DL — SIGNIFICANT CHANGE UP (ref 110–370)
WBC # BLD: 7.23 K/UL — SIGNIFICANT CHANGE UP (ref 3.8–10.5)
WBC # BLD: 7.23 K/UL — SIGNIFICANT CHANGE UP (ref 3.8–10.5)
WBC # FLD AUTO: 7.23 K/UL — SIGNIFICANT CHANGE UP (ref 3.8–10.5)
WBC # FLD AUTO: 7.23 K/UL — SIGNIFICANT CHANGE UP (ref 3.8–10.5)

## 2023-12-31 PROCEDURE — 99232 SBSQ HOSP IP/OBS MODERATE 35: CPT

## 2023-12-31 RX ORDER — SODIUM CHLORIDE 9 MG/ML
500 INJECTION, SOLUTION INTRAVENOUS ONCE
Refills: 0 | Status: COMPLETED | OUTPATIENT
Start: 2023-12-31 | End: 2023-12-31

## 2023-12-31 RX ORDER — ASCORBIC ACID 60 MG
500 TABLET,CHEWABLE ORAL DAILY
Refills: 0 | Status: DISCONTINUED | OUTPATIENT
Start: 2023-12-31 | End: 2024-01-04

## 2023-12-31 RX ORDER — MULTIVIT-MIN/FERROUS GLUCONATE 9 MG/15 ML
1 LIQUID (ML) ORAL DAILY
Refills: 0 | Status: DISCONTINUED | OUTPATIENT
Start: 2023-12-31 | End: 2024-01-04

## 2023-12-31 RX ORDER — CHOLECALCIFEROL (VITAMIN D3) 125 MCG
2000 CAPSULE ORAL DAILY
Refills: 0 | Status: DISCONTINUED | OUTPATIENT
Start: 2023-12-31 | End: 2024-01-04

## 2023-12-31 RX ADMIN — Medication 12.5 MILLIGRAM(S): at 05:28

## 2023-12-31 RX ADMIN — Medication 12.5 MILLIGRAM(S): at 17:21

## 2023-12-31 RX ADMIN — ATORVASTATIN CALCIUM 10 MILLIGRAM(S): 80 TABLET, FILM COATED ORAL at 21:33

## 2023-12-31 RX ADMIN — SODIUM CHLORIDE 250 MILLILITER(S): 9 INJECTION, SOLUTION INTRAVENOUS at 10:56

## 2023-12-31 RX ADMIN — ENOXAPARIN SODIUM 40 MILLIGRAM(S): 100 INJECTION SUBCUTANEOUS at 12:51

## 2023-12-31 RX ADMIN — Medication 50 MICROGRAM(S): at 05:28

## 2023-12-31 RX ADMIN — Medication 5 MILLIGRAM(S): at 21:33

## 2023-12-31 RX ADMIN — CLOPIDOGREL BISULFATE 75 MILLIGRAM(S): 75 TABLET, FILM COATED ORAL at 12:51

## 2023-12-31 NOTE — DIETITIAN INITIAL EVALUATION ADULT - PROBLEM SELECTOR PLAN 2
Unclear etiology of fall >1 week ago. Seems to have vasovagal component vs mechanical. Pt denies cardiac hx but not clear why pt on plavix and nebivolol. CTH w/o acute findings. Plain films w/o fracture. Note L knee effusion  -Falls precautions  -PT consult  -Orthostatics  -Repeat CPK in AM, if downtrending can likely stop trending  -TTE  -Cont. plavix for now  -Need med rec [phone] [facility] Unclear etiology of fall >1 week ago. Seems to have vasovagal component vs mechanical. Pt denies cardiac hx but not clear why pt on plavix and nebivolol. CTH w/o acute findings. Plain films w/o fracture. Note L knee effusion  -Falls precautions  -PT consult  -Orthostatics  -Repeat CPK in AM, if downtrending can likely stop trending  -TTE  -Cont. plavix for now  -Need med rec [phone] [facility]

## 2023-12-31 NOTE — DIETITIAN INITIAL EVALUATION ADULT - NSFNSNUTRHOMESUPPLEMENTFT_GEN_A_CORE
Pt stated she takes Vitamin B12 and Vitamin D3 at home prior to admission. Pt stated she does not consume any oral nutrition supplements prior to admission.

## 2023-12-31 NOTE — CHART NOTE - NSCHARTNOTEFT_GEN_A_CORE
Provider informed by Dr. Root to patient complaint of left breast tenderness this morning. Female family members at bedside. Provider assessed patient at bedside, niece present. Patient states she is not currently having any breast pain, but she feels occasional tenderness on the left side. She has been unable to look at her skin for any rashes.       Vital Signs Last 24 Hrs  T(C): 36.4 (31 Dec 2023 05:00), Max: 36.8 (30 Dec 2023 19:50)  T(F): 97.6 (31 Dec 2023 05:00), Max: 98.3 (30 Dec 2023 19:50)  HR: 84 (31 Dec 2023 05:00) (81 - 95)  BP: 164/76 (31 Dec 2023 05:00) (121/73 - 164/76)  BP(mean): --  RR: 18 (31 Dec 2023 05:00) (18 - 18)  SpO2: 98% (31 Dec 2023 05:00) (95% - 98%)    Parameters below as of 31 Dec 2023 05:00  Patient On (Oxygen Delivery Method): room air      Physical Exam:  General: WN/WD NAD  Neurology: A&Ox3, nonfocal, EL x 4  Head:  Normocephalic, atraumatic  Breasts: no supraclavicular or axillary LAD, chest wall nontender to palpation, no skin dimpling, retractions, or erythema to breasts, breasts nontender to palpation, no nipple discharge bilaterally  MSK: No edema, + peripheral pulses, FROM all 4 extremity      HPI:  83F w/ left sided occasional breast pain.    Assessment & Plan:  > Dr. Root informed  > Patient and niece advised to monitor for further pain symptoms and redness to breasts and surrounding skin  > Patient declines pain medication at this time    Daniela Espinosa PA-C Provider informed by Dr. Root to patient complaint of left breast tenderness this morning. Female family members at bedside. Provider assessed patient at bedside, niece present. Patient states she is not currently having any breast pain, but she feels occasional tenderness on the left side. She has been unable to look at her skin for any rashes.       Vital Signs Last 24 Hrs  T(C): 36.4 (31 Dec 2023 05:00), Max: 36.8 (30 Dec 2023 19:50)  T(F): 97.6 (31 Dec 2023 05:00), Max: 98.3 (30 Dec 2023 19:50)  HR: 84 (31 Dec 2023 05:00) (81 - 95)  BP: 164/76 (31 Dec 2023 05:00) (121/73 - 164/76)  BP(mean): --  RR: 18 (31 Dec 2023 05:00) (18 - 18)  SpO2: 98% (31 Dec 2023 05:00) (95% - 98%)    Parameters below as of 31 Dec 2023 05:00  Patient On (Oxygen Delivery Method): room air      Physical Exam:  General: WN/WD NAD  Neurology: A&Ox3, nonfocal, EL x 4  Head:  Normocephalic, atraumatic  Breasts: no supraclavicular or axillary LAD, chest wall nontender to palpation, no skin dimpling, retractions, or erythema to breasts, breasts nontender and without lumps to palpation, no nipple discharge bilaterally  MSK: No edema, + peripheral pulses, FROM all 4 extremity      HPI:  83F w/ left sided occasional breast pain.    Assessment & Plan:  > Dr. Root informed  > Patient and niece advised to monitor for further pain symptoms and redness to breasts and surrounding skin  > Patient declines pain medication at this time    Daniela Espinosa PA-C

## 2023-12-31 NOTE — DIETITIAN INITIAL EVALUATION ADULT - PROBLEM SELECTOR PLAN 5
-Cont. atorvastatin  -Need med rec [phone] [facility] -Cont. atorvastatin  -Need med rec [phone] [facility]

## 2023-12-31 NOTE — DIETITIAN INITIAL EVALUATION ADULT - NSFNSADHERENCEPTAFT_GEN_A_CORE
Prior to admission, pt reported poor PO intake since 12/21 in setting of recent fall and difficulty ambulating off of the couch. Pt stated she has mostly been consuming [employer] for >1 week, sometimes no food at all. Pt stated at baseline, she has a good appetite and PO intake. Pt stated she typically consumes ~3 meals per day. Pt stated she monitors her sugar intake at home, pt endorsed consuming sweets in moderation. Pt stated she used to check her fingersticks daily in the morning, stated she no longer checks them daily. Pt stated she takes Metformin and Tradjenta daily for glycemic control.

## 2023-12-31 NOTE — DIETITIAN INITIAL EVALUATION ADULT - ORAL INTAKE PTA/DIET HISTORY
Nutrition assessment completed at bedside. Pt's niece (Micaela) present at bedside for interview. Pt reported significant decrease in PO intake prior to admission in setting of reduced mobility. Pt stated she fell on December 21st, and since then has been unable to ambulate off of the couch. Pt stated she has mostly been consuming [employer] for >1 week, stated sometimes she has not been eating at all, as she is unable to get to the kitchen. Pt endorsed gradual weight loss since April, stating she was ~162 pounds in April 2023, endorsed weighing ~148 pounds on 12/21. Pt unsure as of reason behind weight loss, endorsed good appetite and PO intake at baseline prior to fall. Confirmed no known food allergies. Pt requesting to have seeds removed from diet, stated she has a "spastic colon." RD to honor pt preference.

## 2023-12-31 NOTE — DIETITIAN INITIAL EVALUATION ADULT - ENERGY INTAKE
Currently in-house, pt endorsed good appetite and PO intake. Pt stated she is eating "100%" of meals provided in-house. RD offered to provide Glucerna 1x/day to optimize protein intake for wound healing, patient amenable to trying supplement. RD obtained food preferences to optimize PO intake, will honor as able. RD provided pt with a menu to assist with food preferences and optimize PO intake.  Adequate (%)

## 2023-12-31 NOTE — DIETITIAN INITIAL EVALUATION ADULT - PERTINENT LABORATORY DATA
12-31    140  |  108  |  28<H>  ----------------------------<  121<H>  3.6   |  22  |  1.22    Ca    9.0      31 Dec 2023 07:05  Mg     2.0     12-30    TPro  6.4  /  Alb  3.6  /  TBili  0.4  /  DBili  x   /  AST  18  /  ALT  12  /  AlkPhos  88  12-30  POCT Blood Glucose.: 145 mg/dL (12-31-23 @ 12:22)  A1C with Estimated Average Glucose Result: 6.2 % (12-30-23 @ 07:05)

## 2023-12-31 NOTE — DIETITIAN INITIAL EVALUATION ADULT - SIGNS/SYMPTOMS
as evidenced by pt meeting <75% EER >7 days, mild muscle/fat loss.  as evidenced by pt with suspected deep tissue injury.

## 2023-12-31 NOTE — PROGRESS NOTE ADULT - PROBLEM SELECTOR PLAN 6
-Cont. levothyroxine  -TSH slightly elevated, will order Free T3 and T4 - wnl.  -Need med rec [phone] [facility] [place] [facility] -done -Cont. levothyroxine  -TSH slightly elevated, will order Free T3 and T4 - wnl.  -Need med rec [phone] [facility] -done

## 2023-12-31 NOTE — PROGRESS NOTE ADULT - PROBLEM SELECTOR PLAN 5
-Cont. atorvastatin  -Need med rec [phone] [facility] -done -Cont. atorvastatin  -Need med rec [phone] [facility] -done

## 2023-12-31 NOTE — DIETITIAN INITIAL EVALUATION ADULT - ADD RECOMMEND
1) Recommend continue current diet as tolerated: Consistent Carbohydrate, Low Sodium. Defer diet texture/consistency to Medical Team.   2) Recommend Glucerna 1x/day to optimize protein-energy intake.   3) Recommend Multivitamin and Vitamin C daily for wound healing unless contraindicated.   4) Monitor and encourage adequate PO intake, RD obtained food preferences to optimize PO intake, will honor as able.   5) Monitor nutritional intake, tolerance to diet prescription, bowel movements, weights, labs, and skin integrity.   6) Malnutrition alert placed in chart.

## 2023-12-31 NOTE — DIETITIAN INITIAL EVALUATION ADULT - PROBLEM SELECTOR PLAN 6
-Cont. levothyroxine  -TSH slightly elevated, will order Free T3 and T4  -Need med rec [phone] [facility] -Cont. levothyroxine  -TSH slightly elevated, will order Free T3 and T4  -Need med rec [phone] [facility]

## 2023-12-31 NOTE — DIETITIAN INITIAL EVALUATION ADULT - ETIOLOGY
related to increased physiological demand for nutrients  related to inability to meet sufficient protein-energy needs in setting of weakness

## 2023-12-31 NOTE — PROGRESS NOTE ADULT - SUBJECTIVE AND OBJECTIVE BOX
Patient is a 83y old  Female who presents with a chief complaint of Weakness     (31 Dec 2023 14:57)        SUBJECTIVE / OVERNIGHT EVENTS: left knee pain with movement persists. reports mild left side chest wall discomfort.       MEDICATIONS  (STANDING):  ascorbic acid 500 milliGRAM(s) Oral daily  atorvastatin 10 milliGRAM(s) Oral at bedtime  clopidogrel Tablet 75 milliGRAM(s) Oral daily  dextrose 5%. 1000 milliLiter(s) (100 mL/Hr) IV Continuous <Continuous>  dextrose 5%. 1000 milliLiter(s) (50 mL/Hr) IV Continuous <Continuous>  dextrose 50% Injectable 25 Gram(s) IV Push once  dextrose 50% Injectable 25 Gram(s) IV Push once  dextrose 50% Injectable 12.5 Gram(s) IV Push once  enoxaparin Injectable 40 milliGRAM(s) SubCutaneous every 24 hours  glucagon  Injectable 1 milliGRAM(s) IntraMuscular once  insulin lispro (ADMELOG) corrective regimen sliding scale   SubCutaneous three times a day before meals  insulin lispro (ADMELOG) corrective regimen sliding scale   SubCutaneous at bedtime  levothyroxine 50 MICROGram(s) Oral daily  melatonin 5 milliGRAM(s) Oral at bedtime  metoprolol tartrate 12.5 milliGRAM(s) Oral two times a day  multivitamin/minerals 1 Tablet(s) Oral daily    MEDICATIONS  (PRN):  acetaminophen     Tablet .. 650 milliGRAM(s) Oral every 6 hours PRN Temp greater or equal to 38C (100.4F), Mild Pain (1 - 3)  dextrose Oral Gel 15 Gram(s) Oral once PRN Blood Glucose LESS THAN 70 milliGRAM(s)/deciliter      Vital Signs Last 24 Hrs  T(C): 36.9 (31 Dec 2023 11:48), Max: 36.9 (31 Dec 2023 11:48)  T(F): 98.4 (31 Dec 2023 11:48), Max: 98.4 (31 Dec 2023 11:48)  HR: 82 (31 Dec 2023 11:48) (82 - 95)  BP: 130/83 (31 Dec 2023 11:48) (121/73 - 164/76)  BP(mean): --  RR: 18 (31 Dec 2023 11:48) (18 - 18)  SpO2: 96% (31 Dec 2023 11:48) (96% - 98%)    Parameters below as of 31 Dec 2023 11:48  Patient On (Oxygen Delivery Method): room air      CAPILLARY BLOOD GLUCOSE      POCT Blood Glucose.: 122 mg/dL (31 Dec 2023 16:59)  POCT Blood Glucose.: 145 mg/dL (31 Dec 2023 12:22)  POCT Blood Glucose.: 120 mg/dL (31 Dec 2023 08:35)  POCT Blood Glucose.: 182 mg/dL (30 Dec 2023 21:50)    I&O's Summary    30 Dec 2023 07:01  -  31 Dec 2023 07:00  --------------------------------------------------------  IN: 1080 mL / OUT: 0 mL / NET: 1080 mL    31 Dec 2023 07:01  -  31 Dec 2023 17:44  --------------------------------------------------------  IN: 240 mL / OUT: 0 mL / NET: 240 mL          PHYSICAL EXAM:   GENERAL: NAD, well-developed  HEAD:  Atraumatic, Normocephalic  EYES:  , conjunctiva and sclera clear  NECK: Supple,    CHEST/LUNG: Clear to auscultation bilaterally; No wheeze  HEART: S1S2 normal. Regular rate and rhythm; No murmurs, rubs, or gallops  ABDOMEN: Soft, Nontender, Nondistended; Bowel sounds present  EXTREMITIES:  No clubbing, cyanosis, or edema; left knee mildly tender/slight swelling.   PSYCH/Neuro: AAOx3. Non-focal.   SKIN: No rashes or lesions      LABS:                        10.7   7.23  )-----------( 237      ( 31 Dec 2023 07:09 )             32.1     12-31    140  |  108  |  28<H>  ----------------------------<  121<H>  3.6   |  22  |  1.22    Ca    9.0      31 Dec 2023 07:05  Mg     2.0     12-30    TPro  6.4  /  Alb  3.6  /  TBili  0.4  /  DBili  x   /  AST  18  /  ALT  12  /  AlkPhos  88  12-30    PT/INR - ( 29 Dec 2023 18:10 )   PT: 11.6 sec;   INR: 1.06 ratio         PTT - ( 29 Dec 2023 18:10 )  PTT:29.6 sec  CARDIAC MARKERS ( 31 Dec 2023 07:05 )  x     / x     / 656 U/L / x     / x      CARDIAC MARKERS ( 30 Dec 2023 07:05 )  x     / x     / 747 U/L / x     / x          Urinalysis Basic - ( 31 Dec 2023 07:05 )    Color: x / Appearance: x / SG: x / pH: x  Gluc: 121 mg/dL / Ketone: x  / Bili: x / Urobili: x   Blood: x / Protein: x / Nitrite: x   Leuk Esterase: x / RBC: x / WBC x   Sq Epi: x / Non Sq Epi: x / Bacteria: x          RADIOLOGY & ADDITIONAL TESTS:    Imaging Personally Reviewed:  Consultant(s) Notes Reviewed:    Care Discussed with Consultants/Other Providers:

## 2023-12-31 NOTE — DIETITIAN INITIAL EVALUATION ADULT - NSFNSGIIOFT_GEN_A_CORE
Pt reported no nausea or vomiting at this time.   Pt endorsed constipation at this time. Pt stated she does not want to be placed on a bowel regimen.   Pt reported "colon issues" for 50+ years, stated some days she experiences diarrhea, and some days she has normal bowel movements. Pt endorsed no current diarrhea at this time.   Last documented BM: 12/29 (per flow sheet)  Bowel Regimen: Pt not currently ordered for a bowel regimen at this time (per orders).  Continue to monitor bowel movements and bowel movement regularity.

## 2023-12-31 NOTE — DIETITIAN INITIAL EVALUATION ADULT - NS FNS DIET ORDER
Diet, Regular:   Consistent Carbohydrate {Evening Snack} (CSTCHOSN)  Low Sodium (12-29-23 @ 23:37) [Active]

## 2023-12-31 NOTE — DIETITIAN INITIAL EVALUATION ADULT - REASON INDICATOR FOR ASSESSMENT
RD consult warranted for Assessment, Education, Pressure Injury 2 or >  Source: Patient, Patient's Niece at bedside (Micaela), Electronic Medical Record  Chart reviewed, events noted.

## 2023-12-31 NOTE — PROGRESS NOTE ADULT - PROBLEM SELECTOR PLAN 2
Unclear etiology of fall >1 week ago. Seems to have vasovagal component vs mechanical. Pt denies cardiac hx but not clear why pt on plavix and nebivolol - except upon further review she reports having a cardiac w/u about 10 years ago and her pmd wanted to give ASA but she is intolerant so he gave plavix, which is likely for some degree of underlying CAD. No stroke hx. CTH w/o acute findings. Plain films w/o fracture. Note L knee effusion  -Falls precautions  -PT consult - recs guevara.  -Orthostatics - neg.  -Repeat CPK -downtrending. -S/p 1L NS. CK lower, but Cr slightly inc so will give 500cc of LR.   -TTE pending. -BNP mildly elevated at 635.  -Cont. plavix for now  -Need med rec [phone] [facility] - completed.  -Vitamin D level low at 22. -takes at home. -Vitamin D 2K u daily.  -patient takes: plavix, atorvastatin, metformin, tradjenta, and synthroid. Unclear etiology of fall >1 week ago. Seems to have vasovagal component vs mechanical. Pt denies cardiac hx but not clear why pt on plavix and nebivolol - except upon further review she reports having a cardiac w/u about 10 years ago and her pmd wanted to give ASA but she is intolerant so he gave plavix, which is likely for some degree of underlying CAD. No stroke hx. CTH w/o acute findings. Plain films w/o fracture. Note L knee effusion  -Falls precautions  -PT consult - recs guevara.  -Orthostatics - neg.  -Repeat CPK -downtrending. -S/p 1L NS. CK lower, but Cr slightly inc so will give 500cc of LR.   -TTE pending. -BNP mildly elevated at 635.  -Cont. plavix for now  -Need med rec [phone] [facility] [place] [place] [facility] - completed.  -Vitamin D level low at 22. -takes at home. -Vitamin D 2K u daily.  -patient takes: plavix, atorvastatin, metformin, tradjenta, and synthroid.

## 2023-12-31 NOTE — PROGRESS NOTE ADULT - PROBLEM SELECTOR PLAN 1
Patient with persistent weakness and difficulty standing/ambulating since fall and prolonged downtime. Possibly deconditioning but will eval for possible metabolic factors.  -Falls precautions  -PT consult - recs PAWAN, patient amenable.   -U/A - positive but not symptomatic, UCx - growing E coli. -f/u pct - borderline. -Consider abx if any leukocytosis, fever, or symptoms.   -F/u T3, T4 - wnl.  -B12 -nl.   -Given L knee effusion and plan for Lovenox for DVT prophylaxis, will order CT L knee to r/o hemarthrosis -still pending.   -D/C planning to PAWAN - patient amenable.

## 2023-12-31 NOTE — DIETITIAN INITIAL EVALUATION ADULT - OTHER CALCULATIONS
Estimated protein-energy needs calculated using dosing weight of 147.9 pounds (12/29) with consideration for suspected deep tissue injury and malnutrition.   Defer fluid calculations to the medical team.

## 2023-12-31 NOTE — PROGRESS NOTE ADULT - PROBLEM SELECTOR PLAN 4
-Trend BP  -Pt on nebivolol? . ?CAD hx per hx  -Need med rec [phone] [facility] -done -Trend BP  -Pt on nebivolol? . ?CAD hx per hx  -Need med rec [phone] [facility] -done

## 2023-12-31 NOTE — DIETITIAN INITIAL EVALUATION ADULT - NSFNSNUTRCHEWSWALLOWFT_GEN_A_CORE
Pt reported no chewing or swallowing difficulties at this time. Defer diet texture/consistency to medical team.

## 2023-12-31 NOTE — DIETITIAN INITIAL EVALUATION ADULT - OTHER INFO
Weights:  - UBW (per patient): 162 pounds (April 2023), 148 pounds (12/21)  - Dosing Weight (per chart): 147.9 pounds (12/29)  - Daily Weights (per flow sheets): No daily weights noted to assess.   - Bed Scale Weight (taken by RD): RD unable to obtain bed scale weight at the time of interview, pt seated off of bed.   - Per [facility]: 147.15 (12/29)  Pt reported ~14 pound weight loss x 8 months (8.7%), not clinically significant at this time. Limited weight history in [facility] available to assess. RD to continue to monitor weights and trends as able.     Pt with weakness (per chart).   - Per chart, "Possibly deconditioning but will eval for possible metabolic factors."    Pt with diabetes mellitus (per chart).  - Ordered for Insulin Lispro (ADMELOG) Corrective Regimen Sliding Scale (per orders).  - A1C 6.2% (12/30) (per chart) indicating good glycemic control for age.  - Average Glucose 133 mg/dL (12/30) (per chart).     Pt with hypothyroid (per chart).  - Ordered for synthroid in-house (per orders).  Weights:  - UBW (per patient): 162 pounds (April 2023), 148 pounds (12/21)  - Dosing Weight (per chart): 147.9 pounds (12/29)  - Daily Weights (per flow sheets): No daily weights noted to assess.   - Bed Scale Weight (taken by RD): RD unable to obtain bed scale weight at the time of interview, pt seated off of bed.   - Per [facility]: 147.15 (12/29)  Pt reported ~14 pound weight loss x 8 months (8.7%), not clinically significant at this time. Limited weight history in [facility] available to assess. RD to continue to monitor weights and trends as able.     Pt with weakness (per chart).   - Per chart, "Possibly deconditioning but will eval for possible metabolic factors."    Pt with diabetes mellitus (per chart).  - Ordered for Insulin Lispro (ADMELOG) Corrective Regimen Sliding Scale (per orders).  - A1C 6.2% (12/30) (per chart) indicating good glycemic control for age.  - Average Glucose 133 mg/dL (12/30) (per chart).     Pt with hypothyroid (per chart).  - Ordered for synthroid in-house (per orders).

## 2023-12-31 NOTE — DIETITIAN INITIAL EVALUATION ADULT - PERTINENT MEDS FT
MEDICATIONS  (STANDING):  atorvastatin 10 milliGRAM(s) Oral at bedtime  clopidogrel Tablet 75 milliGRAM(s) Oral daily  dextrose 5%. 1000 milliLiter(s) (50 mL/Hr) IV Continuous <Continuous>  dextrose 5%. 1000 milliLiter(s) (100 mL/Hr) IV Continuous <Continuous>  dextrose 50% Injectable 12.5 Gram(s) IV Push once  dextrose 50% Injectable 25 Gram(s) IV Push once  dextrose 50% Injectable 25 Gram(s) IV Push once  enoxaparin Injectable 40 milliGRAM(s) SubCutaneous every 24 hours  glucagon  Injectable 1 milliGRAM(s) IntraMuscular once  insulin lispro (ADMELOG) corrective regimen sliding scale   SubCutaneous at bedtime  insulin lispro (ADMELOG) corrective regimen sliding scale   SubCutaneous three times a day before meals  levothyroxine 50 MICROGram(s) Oral daily  melatonin 5 milliGRAM(s) Oral at bedtime  metoprolol tartrate 12.5 milliGRAM(s) Oral two times a day    MEDICATIONS  (PRN):  acetaminophen     Tablet .. 650 milliGRAM(s) Oral every 6 hours PRN Temp greater or equal to 38C (100.4F), Mild Pain (1 - 3)  dextrose Oral Gel 15 Gram(s) Oral once PRN Blood Glucose LESS THAN 70 milliGRAM(s)/deciliter

## 2023-12-31 NOTE — DIETITIAN INITIAL EVALUATION ADULT - PERSON TAUGHT/METHOD
Educated on the importance of meeting adequate protein-energy needs. Encouraged consumption of HBV foods and prioritizing protein foods first at meal times to assist with wound healing. Encouraged consumption of oral nutrition supplement to optimize protein-energy intake. Encouraged small, frequent meals. Emphasized importance of consuming nutrient dense foods and snacks to optimize energy and protein intake. Obtained food preferences, will honor as able. Pt aware RD remains available./verbal instruction/patient instructed

## 2023-12-31 NOTE — DIETITIAN INITIAL EVALUATION ADULT - NS FNS REASON FOR WEIGHT CHANG
Pt endorsed ~14 pound weight loss x 8 months prior to admission. Pt stated she is unsure of the cause of weight loss, endorsed good appetite and PO intake at baseline over the past year up until recent fall.

## 2023-12-31 NOTE — DIETITIAN INITIAL EVALUATION ADULT - PROBLEM SELECTOR PLAN 4
-Trend BP  -Pt on nebivolol?  -Need med rec [phone] [facility] -Trend BP  -Pt on nebivolol?  -Need med rec [phone] [facility]

## 2024-01-01 LAB
ANION GAP SERPL CALC-SCNC: 11 MMOL/L — SIGNIFICANT CHANGE UP (ref 5–17)
ANION GAP SERPL CALC-SCNC: 11 MMOL/L — SIGNIFICANT CHANGE UP (ref 5–17)
BUN SERPL-MCNC: 31 MG/DL — HIGH (ref 7–23)
BUN SERPL-MCNC: 31 MG/DL — HIGH (ref 7–23)
CALCIUM SERPL-MCNC: 9.1 MG/DL — SIGNIFICANT CHANGE UP (ref 8.4–10.5)
CALCIUM SERPL-MCNC: 9.1 MG/DL — SIGNIFICANT CHANGE UP (ref 8.4–10.5)
CHLORIDE SERPL-SCNC: 109 MMOL/L — HIGH (ref 96–108)
CHLORIDE SERPL-SCNC: 109 MMOL/L — HIGH (ref 96–108)
CO2 SERPL-SCNC: 21 MMOL/L — LOW (ref 22–31)
CO2 SERPL-SCNC: 21 MMOL/L — LOW (ref 22–31)
CREAT SERPL-MCNC: 1.16 MG/DL — SIGNIFICANT CHANGE UP (ref 0.5–1.3)
CREAT SERPL-MCNC: 1.16 MG/DL — SIGNIFICANT CHANGE UP (ref 0.5–1.3)
EGFR: 47 ML/MIN/1.73M2 — LOW
EGFR: 47 ML/MIN/1.73M2 — LOW
FERRITIN SERPL-MCNC: 62 NG/ML — SIGNIFICANT CHANGE UP (ref 13–330)
FERRITIN SERPL-MCNC: 62 NG/ML — SIGNIFICANT CHANGE UP (ref 13–330)
GLUCOSE BLDC GLUCOMTR-MCNC: 115 MG/DL — HIGH (ref 70–99)
GLUCOSE BLDC GLUCOMTR-MCNC: 115 MG/DL — HIGH (ref 70–99)
GLUCOSE BLDC GLUCOMTR-MCNC: 135 MG/DL — HIGH (ref 70–99)
GLUCOSE BLDC GLUCOMTR-MCNC: 135 MG/DL — HIGH (ref 70–99)
GLUCOSE BLDC GLUCOMTR-MCNC: 142 MG/DL — HIGH (ref 70–99)
GLUCOSE BLDC GLUCOMTR-MCNC: 142 MG/DL — HIGH (ref 70–99)
GLUCOSE BLDC GLUCOMTR-MCNC: 152 MG/DL — HIGH (ref 70–99)
GLUCOSE BLDC GLUCOMTR-MCNC: 152 MG/DL — HIGH (ref 70–99)
GLUCOSE SERPL-MCNC: 108 MG/DL — HIGH (ref 70–99)
GLUCOSE SERPL-MCNC: 108 MG/DL — HIGH (ref 70–99)
HCT VFR BLD CALC: 32.2 % — LOW (ref 34.5–45)
HCT VFR BLD CALC: 32.2 % — LOW (ref 34.5–45)
HGB BLD-MCNC: 10.5 G/DL — LOW (ref 11.5–15.5)
HGB BLD-MCNC: 10.5 G/DL — LOW (ref 11.5–15.5)
MCHC RBC-ENTMCNC: 29.7 PG — SIGNIFICANT CHANGE UP (ref 27–34)
MCHC RBC-ENTMCNC: 29.7 PG — SIGNIFICANT CHANGE UP (ref 27–34)
MCHC RBC-ENTMCNC: 32.6 GM/DL — SIGNIFICANT CHANGE UP (ref 32–36)
MCHC RBC-ENTMCNC: 32.6 GM/DL — SIGNIFICANT CHANGE UP (ref 32–36)
MCV RBC AUTO: 91.2 FL — SIGNIFICANT CHANGE UP (ref 80–100)
MCV RBC AUTO: 91.2 FL — SIGNIFICANT CHANGE UP (ref 80–100)
NRBC # BLD: 0 /100 WBCS — SIGNIFICANT CHANGE UP (ref 0–0)
NRBC # BLD: 0 /100 WBCS — SIGNIFICANT CHANGE UP (ref 0–0)
PLATELET # BLD AUTO: 229 K/UL — SIGNIFICANT CHANGE UP (ref 150–400)
PLATELET # BLD AUTO: 229 K/UL — SIGNIFICANT CHANGE UP (ref 150–400)
POTASSIUM SERPL-MCNC: 3.9 MMOL/L — SIGNIFICANT CHANGE UP (ref 3.5–5.3)
POTASSIUM SERPL-MCNC: 3.9 MMOL/L — SIGNIFICANT CHANGE UP (ref 3.5–5.3)
POTASSIUM SERPL-SCNC: 3.9 MMOL/L — SIGNIFICANT CHANGE UP (ref 3.5–5.3)
POTASSIUM SERPL-SCNC: 3.9 MMOL/L — SIGNIFICANT CHANGE UP (ref 3.5–5.3)
RBC # BLD: 3.53 M/UL — LOW (ref 3.8–5.2)
RBC # BLD: 3.53 M/UL — LOW (ref 3.8–5.2)
RBC # FLD: 14.5 % — SIGNIFICANT CHANGE UP (ref 10.3–14.5)
RBC # FLD: 14.5 % — SIGNIFICANT CHANGE UP (ref 10.3–14.5)
SODIUM SERPL-SCNC: 141 MMOL/L — SIGNIFICANT CHANGE UP (ref 135–145)
SODIUM SERPL-SCNC: 141 MMOL/L — SIGNIFICANT CHANGE UP (ref 135–145)
VIT B12 SERPL-MCNC: 948 PG/ML — SIGNIFICANT CHANGE UP (ref 232–1245)
VIT B12 SERPL-MCNC: 948 PG/ML — SIGNIFICANT CHANGE UP (ref 232–1245)
WBC # BLD: 6.02 K/UL — SIGNIFICANT CHANGE UP (ref 3.8–10.5)
WBC # BLD: 6.02 K/UL — SIGNIFICANT CHANGE UP (ref 3.8–10.5)
WBC # FLD AUTO: 6.02 K/UL — SIGNIFICANT CHANGE UP (ref 3.8–10.5)
WBC # FLD AUTO: 6.02 K/UL — SIGNIFICANT CHANGE UP (ref 3.8–10.5)

## 2024-01-01 PROCEDURE — 99232 SBSQ HOSP IP/OBS MODERATE 35: CPT

## 2024-01-01 PROCEDURE — 93306 TTE W/DOPPLER COMPLETE: CPT | Mod: 26

## 2024-01-01 PROCEDURE — 73700 CT LOWER EXTREMITY W/O DYE: CPT | Mod: 26,LT

## 2024-01-01 RX ADMIN — Medication 50 MICROGRAM(S): at 05:27

## 2024-01-01 RX ADMIN — Medication 2000 UNIT(S): at 12:41

## 2024-01-01 RX ADMIN — Medication 12.5 MILLIGRAM(S): at 05:27

## 2024-01-01 RX ADMIN — Medication 500 MILLIGRAM(S): at 12:41

## 2024-01-01 RX ADMIN — Medication 1 TABLET(S): at 12:41

## 2024-01-01 RX ADMIN — Medication 12.5 MILLIGRAM(S): at 17:29

## 2024-01-01 RX ADMIN — ENOXAPARIN SODIUM 40 MILLIGRAM(S): 100 INJECTION SUBCUTANEOUS at 12:41

## 2024-01-01 RX ADMIN — ATORVASTATIN CALCIUM 10 MILLIGRAM(S): 80 TABLET, FILM COATED ORAL at 21:44

## 2024-01-01 RX ADMIN — Medication 1: at 17:27

## 2024-01-01 RX ADMIN — CLOPIDOGREL BISULFATE 75 MILLIGRAM(S): 75 TABLET, FILM COATED ORAL at 12:41

## 2024-01-01 NOTE — PROGRESS NOTE ADULT - PROBLEM SELECTOR PLAN 1
Patient with persistent weakness and difficulty standing/ambulating since fall and prolonged downtime. Possibly deconditioning but will eval for possible metabolic factors.  -Falls precautions  -PT consult - recs PAWAN, patient amenable.   -U/A - positive but not symptomatic, UCx - growing E coli. -f/u pct - borderline. -Consider abx if any leukocytosis, fever, or symptoms. -She denies dysuria.   -F/u T3, T4 - wnl.  -B12 -nl.   -Given L knee effusion and plan for Lovenox for DVT prophylaxis, will order CT L knee to r/o hemarthrosis -still pending.   -D/C planning to [facility] - patient amenable. -F/u CM.

## 2024-01-01 NOTE — PROGRESS NOTE ADULT - PROBLEM SELECTOR PLAN 6
-Cont. levothyroxine  -TSH slightly elevated, will order Free T3 and T4 - wnl.  -Need med rec [phone] [facility] -done -Cont. levothyroxine  -TSH slightly elevated, will order Free T3 and T4 - wnl.  -Need med rec [phone] [facility] -[place]

## 2024-01-01 NOTE — PROGRESS NOTE ADULT - PROBLEM SELECTOR PLAN 7
DVT PPx  -Lovenox    8. Discussed plan with ACP Daniela and patient and her niece at bedside.  -Anticipating DCP soon.

## 2024-01-01 NOTE — DISCHARGE NOTE PROVIDER - NSDCMRMEDTOKEN_GEN_ALL_CORE_FT
English atorvastatin 10 mg oral tablet: 1 tab(s) orally once a day (at bedtime)  clopidogrel 75 mg oral tablet: 1 tab(s) orally once a day  levothyroxine 50 mcg (0.05 mg) oral tablet: 1 tab(s) orally once a day  metFORMIN 500 mg oral tablet: 1 tab(s) orally 2 times a day  nebivolol 5 mg oral tablet: 1 tab(s) orally once a day (at bedtime)  Tradjenta 5 mg oral tablet: 1 tab(s) orally once a day   ascorbic acid 500 mg oral tablet: 1 tab(s) orally once a day  atorvastatin 10 mg oral tablet: 1 tab(s) orally once a day (at bedtime)  cholecalciferol oral tablet: 2000 unit(s) orally once a day  clopidogrel 75 mg oral tablet: 1 tab(s) orally once a day  levothyroxine 50 mcg (0.05 mg) oral tablet: 1 tab(s) orally once a day  metFORMIN 500 mg oral tablet: 1 tab(s) orally 2 times a day  Multiple Vitamins with Minerals oral tablet: 1 tab(s) orally once a day  Tradjenta 5 mg oral tablet: 1 tab(s) orally once a day

## 2024-01-01 NOTE — PROGRESS NOTE ADULT - SUBJECTIVE AND OBJECTIVE BOX
Patient is a 83y old  Female who presents with a chief complaint of Lethargy, weakness (01 Jan 2024 10:50)        SUBJECTIVE / OVERNIGHT EVENTS: patient says feels ok. denies dysuria.       MEDICATIONS  (STANDING):  ascorbic acid 500 milliGRAM(s) Oral daily  atorvastatin 10 milliGRAM(s) Oral at bedtime  cholecalciferol 2000 Unit(s) Oral daily  clopidogrel Tablet 75 milliGRAM(s) Oral daily  dextrose 5%. 1000 milliLiter(s) (100 mL/Hr) IV Continuous <Continuous>  dextrose 5%. 1000 milliLiter(s) (50 mL/Hr) IV Continuous <Continuous>  dextrose 50% Injectable 25 Gram(s) IV Push once  dextrose 50% Injectable 25 Gram(s) IV Push once  dextrose 50% Injectable 12.5 Gram(s) IV Push once  enoxaparin Injectable 40 milliGRAM(s) SubCutaneous every 24 hours  glucagon  Injectable 1 milliGRAM(s) IntraMuscular once  insulin lispro (ADMELOG) corrective regimen sliding scale   SubCutaneous at bedtime  insulin lispro (ADMELOG) corrective regimen sliding scale   SubCutaneous three times a day before meals  levothyroxine 50 MICROGram(s) Oral daily  melatonin 5 milliGRAM(s) Oral at bedtime  metoprolol tartrate 12.5 milliGRAM(s) Oral two times a day  multivitamin/minerals 1 Tablet(s) Oral daily    MEDICATIONS  (PRN):  acetaminophen     Tablet .. 650 milliGRAM(s) Oral every 6 hours PRN Temp greater or equal to 38C (100.4F), Mild Pain (1 - 3)  dextrose Oral Gel 15 Gram(s) Oral once PRN Blood Glucose LESS THAN 70 milliGRAM(s)/deciliter      Vital Signs Last 24 Hrs  T(C): 36.4 (01 Jan 2024 20:08), Max: 36.6 (01 Jan 2024 05:31)  T(F): 97.6 (01 Jan 2024 20:08), Max: 97.9 (01 Jan 2024 05:31)  HR: 80 (01 Jan 2024 20:08) (77 - 80)  BP: 119/75 (01 Jan 2024 20:08) (119/75 - 130/80)  BP(mean): --  RR: 18 (01 Jan 2024 20:08) (18 - 18)  SpO2: 100% (01 Jan 2024 20:08) (96% - 100%)    Parameters below as of 01 Jan 2024 20:08  Patient On (Oxygen Delivery Method): room air      CAPILLARY BLOOD GLUCOSE      POCT Blood Glucose.: 152 mg/dL (01 Jan 2024 17:01)  POCT Blood Glucose.: 142 mg/dL (01 Jan 2024 12:29)  POCT Blood Glucose.: 115 mg/dL (01 Jan 2024 08:16)  POCT Blood Glucose.: 138 mg/dL (31 Dec 2023 21:01)    I&O's Summary    31 Dec 2023 07:01  -  01 Jan 2024 07:00  --------------------------------------------------------  IN: 780 mL / OUT: 600 mL / NET: 180 mL    01 Jan 2024 07:01  -  01 Jan 2024 20:48  --------------------------------------------------------  IN: 720 mL / OUT: 0 mL / NET: 720 mL          PHYSICAL EXAM:   GENERAL: NAD, well-developed  HEAD:  Atraumatic, Normocephalic  EYES:   conjunctiva and sclera clear  NECK: Supple,   CHEST/LUNG: Clear to auscultation bilaterally; No wheeze  HEART: S1S2 normal. Regular rate and rhythm; No murmurs, rubs, or gallops  ABDOMEN: Soft, Nontender, Nondistended; Bowel sounds present  EXTREMITIES:    No clubbing, cyanosis, or edema. left knee mildly tender.   PSYCH/Neuro: AAOx3. Non-focal.   SKIN: No rashes or lesions      LABS:                        10.5   6.02  )-----------( 229      ( 01 Jan 2024 06:50 )             32.2     01-01    141  |  109<H>  |  31<H>  ----------------------------<  108<H>  3.9   |  21<L>  |  1.16    Ca    9.1      01 Jan 2024 06:50        CARDIAC MARKERS ( 31 Dec 2023 07:05 )  x     / x     / 656 U/L / x     / x          Urinalysis Basic - ( 01 Jan 2024 06:50 )    Color: x / Appearance: x / SG: x / pH: x  Gluc: 108 mg/dL / Ketone: x  / Bili: x / Urobili: x   Blood: x / Protein: x / Nitrite: x   Leuk Esterase: x / RBC: x / WBC x   Sq Epi: x / Non Sq Epi: x / Bacteria: x          RADIOLOGY & ADDITIONAL TESTS:    Imaging Personally Reviewed:  Consultant(s) Notes Reviewed:    Care Discussed with Consultants/Other Providers:

## 2024-01-01 NOTE — DISCHARGE NOTE PROVIDER - NSDCCPCAREPLAN_GEN_ALL_CORE_FT
PRINCIPAL DISCHARGE DIAGNOSIS  Diagnosis: Weakness  Assessment and Plan of Treatment: Physical therapy recommended subacute rehab  Urinalysis positive for UTI but not symptomatic, urine culture growing E coli. No antibiotics at this time given there is no leukocytosis, fever, or symptoms.   Thyroid functions tests abd vitamin B12 levels within normal limits  Orthostatic blood pressures negative  -TTE pending. -BNP mildly elevated at 635. *****  Please follow-up with your primary care physician within one week of discharge.      SECONDARY DISCHARGE DIAGNOSES  Diagnosis: Effusion, left knee  Assessment and Plan of Treatment: Xray of knees shows left knee effusion  CT left knee shows... *****    Diagnosis: Fall  Assessment and Plan of Treatment: Your risk of falling increases as you grow older. That's because getting older can make it harder to walk steadily and keep your balance. Also, the effects of falls are more serious in older people. Several things can increase your risk of a fall such as illness, change in the medicines you take, unsafe or unfamiliar setting (for example, a room with rugs or furniture that might trip you, or an area you don't know well). It is important to tell your doctor about any times you have fallen or almost fallen. He or she can then suggest ways to prevent another fall. Some health problems can put you at risk of falling. These include conditions that affect eyesight, hearing, muscle strength, or balance. Certain medicines can increase the risk of falling. These include some medicines that are used for sleeping problems, anxiety, or depression. Adding new medicines, or changing doses of some medicines, can also affect your risk of falling. Your doctor might switch you to a different medicine. Prevent falls by make your home safer – To avoid falling at home, get rid of things that might make you trip or slip. This might include furniture, electrical cords, clutter, and loose rugs. Keep your home well lit so that you can easily see where you are going. Avoid storing things in high places so you don't have to reach or climb. Wear sturdy shoes that fit well – Wearing shoes with high heels or slippery soles, or shoes that are too loose, can lead to falls. Take vitamin D pills which might lower the risk of falls in older people. This is because vitamin D helps make bones and muscles stronger. Use a cane, walker, and other safety devices as these devices help you avoid falling, too. These include grab bars or a sturdy seat for the shower, non-slip bath mats, and hand rails or treads for the stairs (to prevent slipping). If you worry that you could fall, there are also alarm buttons that let you call for help if you fall and can't get up.     PRINCIPAL DISCHARGE DIAGNOSIS  Diagnosis: Weakness  Assessment and Plan of Treatment: Physical therapy recommended subacute rehab  Urinalysis positive for UTI but not symptomatic, urine culture growing E coli. No antibiotics at this time given there is no leukocytosis, fever, or symptoms.   Thyroid functions tests abd vitamin B12 levels within normal limits  Orthostatic blood pressures negative  TTE results show EF of 58%.   Please follow-up with your primary care physician within one week of discharge.      SECONDARY DISCHARGE DIAGNOSES  Diagnosis: Fall  Assessment and Plan of Treatment: Your risk of falling increases as you grow older. That's because getting older can make it harder to walk steadily and keep your balance. Also, the effects of falls are more serious in older people. Several things can increase your risk of a fall such as illness, change in the medicines you take, unsafe or unfamiliar setting (for example, a room with rugs or furniture that might trip you, or an area you don't know well). It is important to tell your doctor about any times you have fallen or almost fallen. He or she can then suggest ways to prevent another fall. Some health problems can put you at risk of falling. These include conditions that affect eyesight, hearing, muscle strength, or balance. Certain medicines can increase the risk of falling. These include some medicines that are used for sleeping problems, anxiety, or depression. Adding new medicines, or changing doses of some medicines, can also affect your risk of falling. Your doctor might switch you to a different medicine. Prevent falls by make your home safer – To avoid falling at home, get rid of things that might make you trip or slip. This might include furniture, electrical cords, clutter, and loose rugs. Keep your home well lit so that you can easily see where you are going. Avoid storing things in high places so you don't have to reach or climb. Wear sturdy shoes that fit well – Wearing shoes with high heels or slippery soles, or shoes that are too loose, can lead to falls. Take vitamin D pills which might lower the risk of falls in older people. This is because vitamin D helps make bones and muscles stronger. Use a cane, walker, and other safety devices as these devices help you avoid falling, too. These include grab bars or a sturdy seat for the shower, non-slip bath mats, and hand rails or treads for the stairs (to prevent slipping). If you worry that you could fall, there are also alarm buttons that let you call for help if you fall and can't get up.    Diagnosis: Effusion, left knee  Assessment and Plan of Treatment: Xray of knees shows left knee effusion  CT left knee shows: severe lateral compartment L knee arthorsis.     PRINCIPAL DISCHARGE DIAGNOSIS  Diagnosis: Weakness  Assessment and Plan of Treatment: Physical therapy recommended subacute rehab  Urinalysis positive for UTI but not symptomatic, urine culture growing E coli. No antibiotics at this time given there is no leukocytosis, fever, or symptoms.   Thyroid functions tests abd vitamin B12 levels within normal limits  Orthostatic blood pressures negative  TTE results show EF of 58%.   Please follow-up with your primary care physician within one week of discharge.      SECONDARY DISCHARGE DIAGNOSES  Diagnosis: Fall  Assessment and Plan of Treatment: Your risk of falling increases as you grow older. That's because getting older can make it harder to walk steadily and keep your balance. Also, the effects of falls are more serious in older people. Several things can increase your risk of a fall such as illness, change in the medicines you take, unsafe or unfamiliar setting (for example, a room with rugs or furniture that might trip you, or an area you don't know well). It is important to tell your doctor about any times you have fallen or almost fallen. He or she can then suggest ways to prevent another fall. Some health problems can put you at risk of falling. These include conditions that affect eyesight, hearing, muscle strength, or balance. Certain medicines can increase the risk of falling. These include some medicines that are used for sleeping problems, anxiety, or depression. Adding new medicines, or changing doses of some medicines, can also affect your risk of falling. Your doctor might switch you to a different medicine. Prevent falls by make your home safer – To avoid falling at home, get rid of things that might make you trip or slip. This might include furniture, electrical cords, clutter, and loose rugs. Keep your home well lit so that you can easily see where you are going. Avoid storing things in high places so you don't have to reach or climb. Wear sturdy shoes that fit well – Wearing shoes with high heels or slippery soles, or shoes that are too loose, can lead to falls. Take vitamin D pills which might lower the risk of falls in older people. This is because vitamin D helps make bones and muscles stronger. Use a cane, walker, and other safety devices as these devices help you avoid falling, too. These include grab bars or a sturdy seat for the shower, non-slip bath mats, and hand rails or treads for the stairs (to prevent slipping). If you worry that you could fall, there are also alarm buttons that let you call for help if you fall and can't get up.    Diagnosis: Effusion, left knee  Assessment and Plan of Treatment: Xray of knees shows left knee effusion  CT left knee shows: severe lateral compartment L knee arthorsis.  Please follow-up with orthopedic doctor, Dr. Kate.

## 2024-01-01 NOTE — DISCHARGE NOTE PROVIDER - DETAILS OF MALNUTRITION DIAGNOSIS/DIAGNOSES
This patient has been assessed with a concern for Malnutrition and was treated during this hospitalization for the following Nutrition diagnosis/diagnoses:     -  12/31/2023: Moderate protein-calorie malnutrition

## 2024-01-01 NOTE — DISCHARGE NOTE PROVIDER - CARE PROVIDER_API CALL
Kandice Dejesus  [facility]  [address] [place] [address]  Phone: [phone]  Fax: [phone]  Established Patient  Follow Up Time: 1 week   Kandice Dejesus  [facility]  [address] [place] [address]  Phone: [phone]  Fax: [phone]  Established Patient  Follow Up Time: 1 week   Kandice Dejesus  [facility]  [address] [place] [address]  Phone: [phone]  Fax: [phone]  Established Patient  Follow Up Time: 1 week    Shahzad Kate  Orthopaedic Surgery  [address] [place] [address] [address]  [place] [address]  Phone: [phone]  Fax: [phone]  Follow Up Time: 2 weeks   Kandice Dejesus  [facility]  [address] [place] [address]  Phone: [phone]  Fax: [phone]  Established Patient  Follow Up Time: 1 week    Shahzad Kate  Orthopaedic Surgery  [address] [place] [address] [address]  [place] [address]  Phone: [phone]  Fax: [phone]  Follow Up Time: 2 weeks

## 2024-01-01 NOTE — DISCHARGE NOTE PROVIDER - CONDITIONS AT DISCHARGE
per Dr Root Request from Dr. Sanon to facilitate patient discharge. Medication reconciliation reviewed, revised and resolved with Dr. Sanon, who has medically cleared patient for discharge.

## 2024-01-01 NOTE — DISCHARGE NOTE PROVIDER - PROVIDER TOKENS
PROVIDER:[TOKEN:[[id number]],FOLLOWUP:[1 week],ESTABLISHEDPATIENT:[T]] PROVIDER:[TOKEN:[[id number]],FOLLOWUP:[1 week],ESTABLISHEDPATIENT:[T]] PROVIDER:[TOKEN:[[id number]],FOLLOWUP:[1 week],ESTABLISHEDPATIENT:[T]],PROVIDER:[TOKEN:[[id number]],FOLLOWUP:[2 weeks]] PROVIDER:[TOKEN:[[id number]],FOLLOWUP:[1 week],ESTABLISHEDPATIENT:[T]],PROVIDER:[[id number]],FOLLOWUP:[2 weeks]]

## 2024-01-01 NOTE — DISCHARGE NOTE PROVIDER - HOSPITAL COURSE
HPI:  83F w/ hx of DM2, HTN, HLD, OA p/w weakness and inability to ambulate. Pt endorses ~10 days ago she went out with niece for most of day and this was significantly more exertion than she was used to. Told niece not to call that night since they were together. At some point at dining table she tried to get up that night and fell to floor. Was on floor for almost 1 day as she was unable to  phone calls of niece and multiple friends. She was helped back up. After that patient had difficulty getting up when sitting in cough but able to stand when in chair. Has wooden armrest on couch and hit head on this as she was adjusting herself on couch which resulted in her forehead bruise. Nieces became concerned over the course of the week at pt's inability to get off couch. Had ultimatum that if patient did not get medical evaluation today they would call police to force her to hospital. When fire department came today to help, she could no longer stand from chair and realized she was deteriorating and needed to get evaluation. Denies any fevers, chills, cough, SOB, chest pain, palpitations, LOC, abdominal pain, dysuria or urinary frequency. Endorses chronic episodes of diarrhea but not recently. Not sure why she is on plavix but denies cardiac history.    In ER; Given NS 1L (29 Dec 2023 22:34)    Hospital Course: ****      Important Medication Changes and Reason: ****    Active or Pending Issues Requiring Follow-up:  PCP    Advanced Directives:   [x ] Full code  [ ] DNR  [ ] Hospice    Discharge Diagnoses:  Weakness  Fall  Left Knee Effusion  HTN  T2DM  HLD         HPI:  83F w/ hx of DM2, HTN, HLD, OA p/w weakness and inability to ambulate. Pt endorses ~10 days ago she went out with niece for most of day and this was significantly more exertion than she was used to. Told niece not to call that night since they were together. At some point at dining table she tried to get up that night and fell to floor. Was on floor for almost 1 day as she was unable to  phone calls of niece and multiple friends. She was helped back up. After that patient had difficulty getting up when sitting in cough but able to stand when in chair. Has wooden armrest on couch and hit head on this as she was adjusting herself on couch which resulted in her forehead bruise. Nieces became concerned over the course of the week at pt's inability to get off couch. Had ultimatum that if patient did not get medical evaluation today they would call police to force her to hospital. When fire department came today to help, she could no longer stand from chair and realized she was deteriorating and needed to get evaluation. Denies any fevers, chills, cough, SOB, chest pain, palpitations, LOC, abdominal pain, dysuria or urinary frequency. Endorses chronic episodes of diarrhea but not recently. Not sure why she is on plavix but denies cardiac history.    Hospital Course: CTH and CT cervical spine done, No acute intracranial injury. No acute fracture or traumatic malalignment. Degenerative changes with notable canal and foraminal stenosis C5/C6 and C6/C7.   CK in ED: 888. Patient given IVF and down trending CK.   Patient had TTE done on 01/01/2024. EF of 58%, no regional wall motion abnormalities. There is normal LV mass and concentric remodeling. There is mild (grade 1) left ventricular diastolic dysfunction. The aortic valve was not well visualized. There is calcification of the aortic valve leaflets. There is no aortic valve stenosis. There is trace aortic regurgitation.  The inferior vena cava is normal in size (normal <2.1cm) with normal inspiratory collapse (normal >50%) consistent with normal right atrial pressure (~3, range 0-5mmHg).  On X-ray L knee effusion seen, patient had CT scan of L knee done, results revealed: No fracture. Severe lateral comparement knee arthrosis.   Patient has a history of hypothyroidism, on levothyroxine. TSH 5.64 on 12/29/2023. T3 and T4 sent to lab, WNL.     Important Medication Changes and Reason: ****    Active or Pending Issues Requiring Follow-up:  Follow-up with PCP.     Advanced Directives:   [X] Full code  [ ] DNR  [ ] Hospice    Discharge Diagnoses:  Weakness  Fall  Left Knee Effusion  HTN  T2DM  HLD         HPI:  83F w/ hx of DM2, HTN, HLD, OA p/w weakness and inability to ambulate. Pt endorses ~10 days ago she went out with niece for most of day and this was significantly more exertion than she was used to. Told niece not to call that night since they were together. At some point at dining table she tried to get up that night and fell to floor. Was on floor for almost 1 day as she was unable to  phone calls of niece and multiple friends. She was helped back up. After that patient had difficulty getting up when sitting in cough but able to stand when in chair. Has wooden armrest on couch and hit head on this as she was adjusting herself on couch which resulted in her forehead bruise. Nieces became concerned over the course of the week at pt's inability to get off couch. Had ultimatum that if patient did not get medical evaluation today they would call police to force her to hospital. When fire department came today to help, she could no longer stand from chair and realized she was deteriorating and needed to get evaluation. Denies any fevers, chills, cough, SOB, chest pain, palpitations, LOC, abdominal pain, dysuria or urinary frequency.   Hospital Course: CTH and CT cervical spine done, No acute intracranial injury. No acute fracture or traumatic malalignment. Degenerative changes with notable canal and foraminal stenosis C5/C6 and C6/C7.   CK in ED: 888. Patient given IVF and down trending CK.   Patient had TTE done on 01/01/2024. EF of 58%, no regional wall motion abnormalities. There is normal LV mass and concentric remodeling. There is mild (grade 1) left ventricular diastolic dysfunction. The aortic valve was not well visualized. There is calcification of the aortic valve leaflets. There is no aortic valve stenosis. There is trace aortic regurgitation.  The inferior vena cava is normal in size (normal <2.1cm) with normal inspiratory collapse (normal >50%) consistent with normal right atrial pressure (~3, range 0-5mmHg).  On X-ray L knee effusion seen, patient had CT scan of L knee done, results revealed: No fracture. Severe lateral compartment knee arthrosis.   Patient has a history of hypothyroidism, on levothyroxine. TSH 5.64 on 12/29/2023. T3 and T4 sent to lab, WNL.     Important Medication Changes and Reason:   Continue taking metoprolol tartrate 12.5mg BID as was taking in the hospital.     Active or Pending Issues Requiring Follow-up:  Follow-up with PCP.   Follow-up with Dr. Kate, orthopedic doctor.     Advanced Directives:   [X] Full code  [ ] DNR  [ ] Hospice    Discharge Diagnoses:  Weakness  Fall  Left Knee Effusion  HTN  T2DM  HLD    Request from Dr. Sanon to facilitate patient discharge. Medication reconciliation reviewed, revised and resolved with Dr. Sanon, who has medically cleared patient for discharge. HPI:  83F w/ hx of DM2, HTN, HLD, OA p/w weakness and inability to ambulate. Pt endorses ~10 days ago she went out with niece for most of day and this was significantly more exertion than she was used to. Told niece not to call that night since they were together. At some point at dining table she tried to get up that night and fell to floor. Was on floor for almost 1 day as she was unable to  phone calls of niece and multiple friends. She was helped back up. After that patient had difficulty getting up when sitting in cough but able to stand when in chair. Has wooden armrest on couch and hit head on this as she was adjusting herself on couch which resulted in her forehead bruise. Nieces became concerned over the course of the week at pt's inability to get off couch. Had ultimatum that if patient did not get medical evaluation today they would call police to force her to hospital. When fire department came today to help, she could no longer stand from chair and realized she was deteriorating and needed to get evaluation. Denies any fevers, chills, cough, SOB, chest pain, palpitations, LOC, abdominal pain, dysuria or urinary frequency.     83F w/ hx of DM2, HTN, HLD, OA p/w weakness and inability to ambulate after fall at home likely with some degree of resolving rhabdo and possible deconditioning.     Patient seen and examined with niece at bedside. No acute events overnight     # Weakness.   likely deconditioning   -Falls precautions  -PT consult - recs [facility], patient amenable.   -Patient has asymptomatic bacteria, monitor off abx   -F/u T3, T4 - wnl.  -B12 -nl.   - CT L knee-+ severe arthrosis no evidence of hematoma f/u w/ ortho outpt. f/u Dr. Kate outpt    #Fall.   -PT consult - recs [facility].  -Orthostatics - neg.  -Repeat CPK -downtrending.   -TTE: unremarkable   -Cont. plavix for now  -Vitamin D level low at 22. -takes at home. -Vitamin D 2K u daily.  -patient takes: plavix, atorvastatin, metformin, tradjenta, and synthroid.    Patient is medically stable for d/c to [facility]  Important Medication Changes and Reason:   Continue taking metoprolol tartrate 12.5mg BID as was taking in the hospital.     Active or Pending Issues Requiring Follow-up:  Follow-up with PCP.   Follow-up with Dr. Kate, orthopedic doctor.     Advanced Directives:   [X] Full code  [ ] DNR  [ ] Hospice    Discharge Diagnoses:  Weakness  Fall  Left Knee Effusion  HTN  T2DM  HLD    Request from Dr. Sanon to facilitate patient discharge. Medication reconciliation reviewed, revised and resolved with Dr. Sanon, who has medically cleared patient for discharge.

## 2024-01-01 NOTE — PROGRESS NOTE ADULT - PROBLEM SELECTOR PLAN 4
-Trend BP  -Pt on nebivolol? . ?CAD hx per hx  -Need med rec [phone] [facility] -done -Trend BP  -Pt on nebivolol? . ?CAD hx per hx  -Need med rec [phone] [facility] [place] [facility] -done

## 2024-01-01 NOTE — PROGRESS NOTE ADULT - PROBLEM SELECTOR PLAN 5
-Cont. atorvastatin  -Need med rec [phone] [facility] -done -Cont. atorvastatin  -Need med rec [phone] [facility] [place] -done

## 2024-01-01 NOTE — PROGRESS NOTE ADULT - PROBLEM SELECTOR PLAN 2
Unclear etiology of fall >1 week ago. Seems to have vasovagal component vs mechanical. Pt denies cardiac hx but not clear why pt on plavix and nebivolol - except upon further review she reports having a cardiac w/u about 10 years ago and her pmd wanted to give ASA but she is intolerant so he gave plavix, which is likely for some degree of underlying CAD. No stroke hx. CTH w/o acute findings. Plain films w/o fracture. Note L knee effusion  -Falls precautions  -PT consult - recs [facility].  -Orthostatics - neg.  -Repeat CPK -downtrending. -S/p 1L NS. CK lower, but Cr slightly inc so gave 500cc of LR. -Today Cr stable.  -TTE pending. -BNP mildly elevated at 635.  -Cont. plavix for now  -Need med rec [phone] [facility] - completed.  -Vitamin D level low at 22. -takes at home. -Vitamin D 2K u daily.  -patient takes: plavix, atorvastatin, metformin, tradjenta, and synthroid. Unclear etiology of fall >1 week ago. Seems to have vasovagal component vs mechanical. Pt denies cardiac hx but not clear why pt on plavix and nebivolol - except upon further review she reports having a cardiac w/u about 10 years ago and her pmd wanted to give ASA but she is intolerant so he gave plavix, which is likely for some degree of underlying CAD. No stroke hx. CTH w/o acute findings. Plain films w/o fracture. Note L knee effusion  -Falls precautions  -PT consult - recs [facility]a.  -Orthostatics - neg.  -Repeat CPK -downtrending. -S/p 1L NS. CK lower, but Cr slightly inc so gave 500cc of LR. -Today Cr stable.  -TTE pending. -BNP mildly elevated at 635.  -Cont. plavix for now  -Need med rec [phone] [facility] [place] [facility] - completed.  -Vitamin D level low at 22. -takes at home. -Vitamin D 2K u daily.  -patient takes: plavix, atorvastatin, metformin, tradjenta, and synthroid.

## 2024-01-01 NOTE — DISCHARGE NOTE PROVIDER - NSDCFUADDINST_GEN_ALL_CORE_FT
Important Medication Changes and Reason:   Continue taking metoprolol tartrate 12.5mg BID as was taking in the hospital.     Active or Pending Issues Requiring Follow-up:  Follow-up with PCP.   Follow-up with Dr. Kate, orthopedic doctor.

## 2024-01-02 LAB
-  AMOXICILLIN/CLAVULANIC ACID: SIGNIFICANT CHANGE UP
-  AMOXICILLIN/CLAVULANIC ACID: SIGNIFICANT CHANGE UP
-  AMPICILLIN/SULBACTAM: SIGNIFICANT CHANGE UP
-  AMPICILLIN/SULBACTAM: SIGNIFICANT CHANGE UP
-  AMPICILLIN: SIGNIFICANT CHANGE UP
-  AMPICILLIN: SIGNIFICANT CHANGE UP
-  AZTREONAM: SIGNIFICANT CHANGE UP
-  AZTREONAM: SIGNIFICANT CHANGE UP
-  CEFAZOLIN: SIGNIFICANT CHANGE UP
-  CEFAZOLIN: SIGNIFICANT CHANGE UP
-  CEFEPIME: SIGNIFICANT CHANGE UP
-  CEFEPIME: SIGNIFICANT CHANGE UP
-  CEFOXITIN: SIGNIFICANT CHANGE UP
-  CEFOXITIN: SIGNIFICANT CHANGE UP
-  CEFTRIAXONE: SIGNIFICANT CHANGE UP
-  CEFTRIAXONE: SIGNIFICANT CHANGE UP
-  CEFUROXIME: SIGNIFICANT CHANGE UP
-  CEFUROXIME: SIGNIFICANT CHANGE UP
-  CIPROFLOXACIN: SIGNIFICANT CHANGE UP
-  CIPROFLOXACIN: SIGNIFICANT CHANGE UP
-  ERTAPENEM: SIGNIFICANT CHANGE UP
-  ERTAPENEM: SIGNIFICANT CHANGE UP
-  GENTAMICIN: SIGNIFICANT CHANGE UP
-  GENTAMICIN: SIGNIFICANT CHANGE UP
-  IMIPENEM: SIGNIFICANT CHANGE UP
-  IMIPENEM: SIGNIFICANT CHANGE UP
-  LEVOFLOXACIN: SIGNIFICANT CHANGE UP
-  LEVOFLOXACIN: SIGNIFICANT CHANGE UP
-  MEROPENEM: SIGNIFICANT CHANGE UP
-  MEROPENEM: SIGNIFICANT CHANGE UP
-  NITROFURANTOIN: SIGNIFICANT CHANGE UP
-  NITROFURANTOIN: SIGNIFICANT CHANGE UP
-  PIPERACILLIN/TAZOBACTAM: SIGNIFICANT CHANGE UP
-  PIPERACILLIN/TAZOBACTAM: SIGNIFICANT CHANGE UP
-  TOBRAMYCIN: SIGNIFICANT CHANGE UP
-  TOBRAMYCIN: SIGNIFICANT CHANGE UP
-  TRIMETHOPRIM/SULFAMETHOXAZOLE: SIGNIFICANT CHANGE UP
-  TRIMETHOPRIM/SULFAMETHOXAZOLE: SIGNIFICANT CHANGE UP
ANION GAP SERPL CALC-SCNC: 13 MMOL/L — SIGNIFICANT CHANGE UP (ref 5–17)
ANION GAP SERPL CALC-SCNC: 13 MMOL/L — SIGNIFICANT CHANGE UP (ref 5–17)
BUN SERPL-MCNC: 33 MG/DL — HIGH (ref 7–23)
BUN SERPL-MCNC: 33 MG/DL — HIGH (ref 7–23)
CALCIUM SERPL-MCNC: 9.2 MG/DL — SIGNIFICANT CHANGE UP (ref 8.4–10.5)
CALCIUM SERPL-MCNC: 9.2 MG/DL — SIGNIFICANT CHANGE UP (ref 8.4–10.5)
CHLORIDE SERPL-SCNC: 106 MMOL/L — SIGNIFICANT CHANGE UP (ref 96–108)
CHLORIDE SERPL-SCNC: 106 MMOL/L — SIGNIFICANT CHANGE UP (ref 96–108)
CO2 SERPL-SCNC: 20 MMOL/L — LOW (ref 22–31)
CO2 SERPL-SCNC: 20 MMOL/L — LOW (ref 22–31)
CREAT SERPL-MCNC: 1.1 MG/DL — SIGNIFICANT CHANGE UP (ref 0.5–1.3)
CREAT SERPL-MCNC: 1.1 MG/DL — SIGNIFICANT CHANGE UP (ref 0.5–1.3)
CULTURE RESULTS: ABNORMAL
CULTURE RESULTS: ABNORMAL
EGFR: 50 ML/MIN/1.73M2 — LOW
EGFR: 50 ML/MIN/1.73M2 — LOW
GLUCOSE BLDC GLUCOMTR-MCNC: 114 MG/DL — HIGH (ref 70–99)
GLUCOSE BLDC GLUCOMTR-MCNC: 114 MG/DL — HIGH (ref 70–99)
GLUCOSE BLDC GLUCOMTR-MCNC: 121 MG/DL — HIGH (ref 70–99)
GLUCOSE BLDC GLUCOMTR-MCNC: 121 MG/DL — HIGH (ref 70–99)
GLUCOSE BLDC GLUCOMTR-MCNC: 150 MG/DL — HIGH (ref 70–99)
GLUCOSE BLDC GLUCOMTR-MCNC: 150 MG/DL — HIGH (ref 70–99)
GLUCOSE BLDC GLUCOMTR-MCNC: 167 MG/DL — HIGH (ref 70–99)
GLUCOSE BLDC GLUCOMTR-MCNC: 167 MG/DL — HIGH (ref 70–99)
GLUCOSE SERPL-MCNC: 130 MG/DL — HIGH (ref 70–99)
GLUCOSE SERPL-MCNC: 130 MG/DL — HIGH (ref 70–99)
HCT VFR BLD CALC: 31.4 % — LOW (ref 34.5–45)
HCT VFR BLD CALC: 31.4 % — LOW (ref 34.5–45)
HGB BLD-MCNC: 10.1 G/DL — LOW (ref 11.5–15.5)
HGB BLD-MCNC: 10.1 G/DL — LOW (ref 11.5–15.5)
MCHC RBC-ENTMCNC: 29.2 PG — SIGNIFICANT CHANGE UP (ref 27–34)
MCHC RBC-ENTMCNC: 29.2 PG — SIGNIFICANT CHANGE UP (ref 27–34)
MCHC RBC-ENTMCNC: 32.2 GM/DL — SIGNIFICANT CHANGE UP (ref 32–36)
MCHC RBC-ENTMCNC: 32.2 GM/DL — SIGNIFICANT CHANGE UP (ref 32–36)
MCV RBC AUTO: 90.8 FL — SIGNIFICANT CHANGE UP (ref 80–100)
MCV RBC AUTO: 90.8 FL — SIGNIFICANT CHANGE UP (ref 80–100)
METHOD TYPE: SIGNIFICANT CHANGE UP
METHOD TYPE: SIGNIFICANT CHANGE UP
NRBC # BLD: 0 /100 WBCS — SIGNIFICANT CHANGE UP (ref 0–0)
NRBC # BLD: 0 /100 WBCS — SIGNIFICANT CHANGE UP (ref 0–0)
ORGANISM # SPEC MICROSCOPIC CNT: ABNORMAL
PLATELET # BLD AUTO: 242 K/UL — SIGNIFICANT CHANGE UP (ref 150–400)
PLATELET # BLD AUTO: 242 K/UL — SIGNIFICANT CHANGE UP (ref 150–400)
POTASSIUM SERPL-MCNC: 3.9 MMOL/L — SIGNIFICANT CHANGE UP (ref 3.5–5.3)
POTASSIUM SERPL-MCNC: 3.9 MMOL/L — SIGNIFICANT CHANGE UP (ref 3.5–5.3)
POTASSIUM SERPL-SCNC: 3.9 MMOL/L — SIGNIFICANT CHANGE UP (ref 3.5–5.3)
POTASSIUM SERPL-SCNC: 3.9 MMOL/L — SIGNIFICANT CHANGE UP (ref 3.5–5.3)
RBC # BLD: 3.46 M/UL — LOW (ref 3.8–5.2)
RBC # BLD: 3.46 M/UL — LOW (ref 3.8–5.2)
RBC # FLD: 14.3 % — SIGNIFICANT CHANGE UP (ref 10.3–14.5)
RBC # FLD: 14.3 % — SIGNIFICANT CHANGE UP (ref 10.3–14.5)
SODIUM SERPL-SCNC: 139 MMOL/L — SIGNIFICANT CHANGE UP (ref 135–145)
SODIUM SERPL-SCNC: 139 MMOL/L — SIGNIFICANT CHANGE UP (ref 135–145)
SPECIMEN SOURCE: SIGNIFICANT CHANGE UP
SPECIMEN SOURCE: SIGNIFICANT CHANGE UP
WBC # BLD: 6.45 K/UL — SIGNIFICANT CHANGE UP (ref 3.8–10.5)
WBC # BLD: 6.45 K/UL — SIGNIFICANT CHANGE UP (ref 3.8–10.5)
WBC # FLD AUTO: 6.45 K/UL — SIGNIFICANT CHANGE UP (ref 3.8–10.5)
WBC # FLD AUTO: 6.45 K/UL — SIGNIFICANT CHANGE UP (ref 3.8–10.5)

## 2024-01-02 PROCEDURE — 99232 SBSQ HOSP IP/OBS MODERATE 35: CPT

## 2024-01-02 RX ADMIN — Medication 12.5 MILLIGRAM(S): at 17:16

## 2024-01-02 RX ADMIN — Medication 2000 UNIT(S): at 11:28

## 2024-01-02 RX ADMIN — CLOPIDOGREL BISULFATE 75 MILLIGRAM(S): 75 TABLET, FILM COATED ORAL at 11:28

## 2024-01-02 RX ADMIN — Medication 1 TABLET(S): at 11:29

## 2024-01-02 RX ADMIN — ATORVASTATIN CALCIUM 10 MILLIGRAM(S): 80 TABLET, FILM COATED ORAL at 21:19

## 2024-01-02 RX ADMIN — Medication 50 MICROGRAM(S): at 05:18

## 2024-01-02 RX ADMIN — Medication 12.5 MILLIGRAM(S): at 05:18

## 2024-01-02 RX ADMIN — ENOXAPARIN SODIUM 40 MILLIGRAM(S): 100 INJECTION SUBCUTANEOUS at 12:18

## 2024-01-02 RX ADMIN — Medication 500 MILLIGRAM(S): at 11:29

## 2024-01-02 NOTE — PROGRESS NOTE ADULT - PROBLEM SELECTOR PLAN 7
DVT PPx  -Lovenox    PAtient is medically stable for d/c to [facility] aware DVT PPx  -Lovenox    PAtient is medically stable for d/c to [facility] aware

## 2024-01-02 NOTE — PROGRESS NOTE ADULT - PROBLEM SELECTOR PLAN 1
likely deconditioning   -Falls precautions  -PT consult - recs PAWAN, patient amenable.   -Patient has asymptomatic bacteria, monitor off abx   -F/u T3, T4 - wnl.  -B12 -nl.   - CT L knee-+ severe arthrosis no evidence of hematoma f/u w/ ortho outpt

## 2024-01-02 NOTE — PROGRESS NOTE ADULT - SUBJECTIVE AND OBJECTIVE BOX
Patient is a 83y old  Female who presents with a chief complaint of Lethargy, weakness (01 Jan 2024 10:50)      SUBJECTIVE / OVERNIGHT EVENTS: Patient seen and examined at bedside. She feels well, denies any complaints no acute events overnight.     ROS:  All other review of systems negative    Allergies    No Known Allergies    Intolerances        MEDICATIONS  (STANDING):  ascorbic acid 500 milliGRAM(s) Oral daily  atorvastatin 10 milliGRAM(s) Oral at bedtime  cholecalciferol 2000 Unit(s) Oral daily  clopidogrel Tablet 75 milliGRAM(s) Oral daily  dextrose 5%. 1000 milliLiter(s) (100 mL/Hr) IV Continuous <Continuous>  dextrose 5%. 1000 milliLiter(s) (50 mL/Hr) IV Continuous <Continuous>  dextrose 50% Injectable 25 Gram(s) IV Push once  dextrose 50% Injectable 25 Gram(s) IV Push once  dextrose 50% Injectable 12.5 Gram(s) IV Push once  enoxaparin Injectable 40 milliGRAM(s) SubCutaneous every 24 hours  glucagon  Injectable 1 milliGRAM(s) IntraMuscular once  insulin lispro (ADMELOG) corrective regimen sliding scale   SubCutaneous at bedtime  insulin lispro (ADMELOG) corrective regimen sliding scale   SubCutaneous three times a day before meals  levothyroxine 50 MICROGram(s) Oral daily  melatonin 5 milliGRAM(s) Oral at bedtime  metoprolol tartrate 12.5 milliGRAM(s) Oral two times a day  multivitamin/minerals 1 Tablet(s) Oral daily    MEDICATIONS  (PRN):  acetaminophen     Tablet .. 650 milliGRAM(s) Oral every 6 hours PRN Temp greater or equal to 38C (100.4F), Mild Pain (1 - 3)  dextrose Oral Gel 15 Gram(s) Oral once PRN Blood Glucose LESS THAN 70 milliGRAM(s)/deciliter      Vital Signs Last 24 Hrs  T(C): 36.5 (02 Jan 2024 12:20), Max: 36.5 (02 Jan 2024 12:20)  T(F): 97.7 (02 Jan 2024 12:20), Max: 97.7 (02 Jan 2024 12:20)  HR: 80 (02 Jan 2024 12:20) (78 - 80)  BP: 112/60 (02 Jan 2024 12:20) (112/60 - 126/79)  BP(mean): --  RR: 18 (02 Jan 2024 12:20) (18 - 18)  SpO2: 100% (02 Jan 2024 12:20) (97% - 100%)    Parameters below as of 02 Jan 2024 12:20  Patient On (Oxygen Delivery Method): room air      CAPILLARY BLOOD GLUCOSE      POCT Blood Glucose.: 150 mg/dL (02 Jan 2024 12:08)  POCT Blood Glucose.: 121 mg/dL (02 Jan 2024 08:11)  POCT Blood Glucose.: 135 mg/dL (01 Jan 2024 21:06)  POCT Blood Glucose.: 152 mg/dL (01 Jan 2024 17:01)    I&O's Summary    01 Jan 2024 07:01  -  02 Jan 2024 07:00  --------------------------------------------------------  IN: 1200 mL / OUT: 0 mL / NET: 1200 mL        PHYSICAL EXAM:  GENERAL: elderly  HEAD:  Atraumatic, Normocephalic  EYES: EOMI, PERRLA, conjunctiva and sclera clear  NECK: Supple, No JVD  CHEST/LUNG: Clear to auscultation bilaterally; No wheeze  HEART: Regular rate and rhythm; No murmurs, rubs, or gallops  ABDOMEN: Soft, Nontender, Nondistended; Bowel sounds present  EXTREMITIES:  2+ Peripheral Pulses, No clubbing, cyanosis, or edema  NEUROLOGY: AAOx3, non-focal      LABS:                        10.1   6.45  )-----------( 242      ( 02 Jan 2024 07:25 )             31.4     01-02    139  |  106  |  33<H>  ----------------------------<  130<H>  3.9   |  20<L>  |  1.10    Ca    9.2      02 Jan 2024 07:25            Urinalysis Basic - ( 02 Jan 2024 07:25 )    Color: x / Appearance: x / SG: x / pH: x  Gluc: 130 mg/dL / Ketone: x  / Bili: x / Urobili: x   Blood: x / Protein: x / Nitrite: x   Leuk Esterase: x / RBC: x / WBC x   Sq Epi: x / Non Sq Epi: x / Bacteria: x        RADIOLOGY & ADDITIONAL TESTS:  CT knee:  IMPRESSION:  No fracture. No lipohemarthrosis.  Severe lateral compartment knee arthrosis.    ECHO:  CONCLUSIONS:   1. Left ventricular cavity is normal. Left ventricular wall thickness is normal. Left ventricular systolic function is normal with an ejection fraction of 58 % by Lyons's method of disks. There are no regional wall motion abnormalities seen.   2. There is normal LV mass and concentric remodeling.   3. There is mild (grade 1) left ventricular diastolic dysfunction.   4. Normal right ventricular cavity size, wall thickness, and systolic function.   5. Normal left and right atrial size.   6. The aortic valve was not well visualized. There is calcification of the aortic valve leaflets. There is no aortic valve stenosis. There is trace aortic regurgitation.   7. No pericardial effusion seen.   8. The inferior vena cava is normal in size (normal <2.1cm) with normal inspiratory collapse (normal >50%) consistent with normal right atrial pressure (~3, range 0-5mmHg).   9. Estimated pulmonary artery systolic pressure is 21 mmHg.    Case Discussed with Patient's [facility] [phone] (Micaela Morel) Patient is a 83y old  Female who presents with a chief complaint of Lethargy, weakness (01 Jan 2024 10:50)      SUBJECTIVE / OVERNIGHT EVENTS: Patient seen and examined at bedside. She feels well, denies any complaints no acute events overnight.     ROS:  All other review of systems negative    Allergies    No Known Allergies    Intolerances        MEDICATIONS  (STANDING):  ascorbic acid 500 milliGRAM(s) Oral daily  atorvastatin 10 milliGRAM(s) Oral at bedtime  cholecalciferol 2000 Unit(s) Oral daily  clopidogrel Tablet 75 milliGRAM(s) Oral daily  dextrose 5%. 1000 milliLiter(s) (100 mL/Hr) IV Continuous <Continuous>  dextrose 5%. 1000 milliLiter(s) (50 mL/Hr) IV Continuous <Continuous>  dextrose 50% Injectable 25 Gram(s) IV Push once  dextrose 50% Injectable 25 Gram(s) IV Push once  dextrose 50% Injectable 12.5 Gram(s) IV Push once  enoxaparin Injectable 40 milliGRAM(s) SubCutaneous every 24 hours  glucagon  Injectable 1 milliGRAM(s) IntraMuscular once  insulin lispro (ADMELOG) corrective regimen sliding scale   SubCutaneous at bedtime  insulin lispro (ADMELOG) corrective regimen sliding scale   SubCutaneous three times a day before meals  levothyroxine 50 MICROGram(s) Oral daily  melatonin 5 milliGRAM(s) Oral at bedtime  metoprolol tartrate 12.5 milliGRAM(s) Oral two times a day  multivitamin/minerals 1 Tablet(s) Oral daily    MEDICATIONS  (PRN):  acetaminophen     Tablet .. 650 milliGRAM(s) Oral every 6 hours PRN Temp greater or equal to 38C (100.4F), Mild Pain (1 - 3)  dextrose Oral Gel 15 Gram(s) Oral once PRN Blood Glucose LESS THAN 70 milliGRAM(s)/deciliter      Vital Signs Last 24 Hrs  T(C): 36.5 (02 Jan 2024 12:20), Max: 36.5 (02 Jan 2024 12:20)  T(F): 97.7 (02 Jan 2024 12:20), Max: 97.7 (02 Jan 2024 12:20)  HR: 80 (02 Jan 2024 12:20) (78 - 80)  BP: 112/60 (02 Jan 2024 12:20) (112/60 - 126/79)  BP(mean): --  RR: 18 (02 Jan 2024 12:20) (18 - 18)  SpO2: 100% (02 Jan 2024 12:20) (97% - 100%)    Parameters below as of 02 Jan 2024 12:20  Patient On (Oxygen Delivery Method): room air      CAPILLARY BLOOD GLUCOSE      POCT Blood Glucose.: 150 mg/dL (02 Jan 2024 12:08)  POCT Blood Glucose.: 121 mg/dL (02 Jan 2024 08:11)  POCT Blood Glucose.: 135 mg/dL (01 Jan 2024 21:06)  POCT Blood Glucose.: 152 mg/dL (01 Jan 2024 17:01)    I&O's Summary    01 Jan 2024 07:01  -  02 Jan 2024 07:00  --------------------------------------------------------  IN: 1200 mL / OUT: 0 mL / NET: 1200 mL        PHYSICAL EXAM:  GENERAL: elderly  HEAD:  Atraumatic, Normocephalic  EYES: EOMI, PERRLA, conjunctiva and sclera clear  NECK: Supple, No JVD  CHEST/LUNG: Clear to auscultation bilaterally; No wheeze  HEART: Regular rate and rhythm; No murmurs, rubs, or gallops  ABDOMEN: Soft, Nontender, Nondistended; Bowel sounds present  EXTREMITIES:  2+ Peripheral Pulses, No clubbing, cyanosis, or edema  NEUROLOGY: AAOx3, non-focal      LABS:                        10.1   6.45  )-----------( 242      ( 02 Jan 2024 07:25 )             31.4     01-02    139  |  106  |  33<H>  ----------------------------<  130<H>  3.9   |  20<L>  |  1.10    Ca    9.2      02 Jan 2024 07:25            Urinalysis Basic - ( 02 Jan 2024 07:25 )    Color: x / Appearance: x / SG: x / pH: x  Gluc: 130 mg/dL / Ketone: x  / Bili: x / Urobili: x   Blood: x / Protein: x / Nitrite: x   Leuk Esterase: x / RBC: x / WBC x   Sq Epi: x / Non Sq Epi: x / Bacteria: x        RADIOLOGY & ADDITIONAL TESTS:  CT knee:  IMPRESSION:  No fracture. No lipohemarthrosis.  Severe lateral compartment knee arthrosis.    ECHO:  CONCLUSIONS:   1. Left ventricular cavity is normal. Left ventricular wall thickness is normal. Left ventricular systolic function is normal with an ejection fraction of 58 % by Lyons's method of disks. There are no regional wall motion abnormalities seen.   2. There is normal LV mass and concentric remodeling.   3. There is mild (grade 1) left ventricular diastolic dysfunction.   4. Normal right ventricular cavity size, wall thickness, and systolic function.   5. Normal left and right atrial size.   6. The aortic valve was not well visualized. There is calcification of the aortic valve leaflets. There is no aortic valve stenosis. There is trace aortic regurgitation.   7. No pericardial effusion seen.   8. The inferior vena cava is normal in size (normal <2.1cm) with normal inspiratory collapse (normal >50%) consistent with normal right atrial pressure (~3, range 0-5mmHg).   9. Estimated pulmonary artery systolic pressure is 21 mmHg.    Case Discussed with Patient's [facility] [phone] (Micaela Morel)

## 2024-01-02 NOTE — PROGRESS NOTE ADULT - PROBLEM SELECTOR PLAN 2
-Falls precautions  -PT consult - recs [facility].  -Orthostatics - neg.  -Repeat CPK -downtrending.   -TTE: unremarkable   -Cont. plavix for now  -Vitamin D level low at 22. -takes at home. -Vitamin D 2K u daily.  -patient takes: plavix, atorvastatin, metformin, tradjenta, and synthroid.

## 2024-01-03 LAB
GLUCOSE BLDC GLUCOMTR-MCNC: 104 MG/DL — HIGH (ref 70–99)
GLUCOSE BLDC GLUCOMTR-MCNC: 104 MG/DL — HIGH (ref 70–99)
GLUCOSE BLDC GLUCOMTR-MCNC: 116 MG/DL — HIGH (ref 70–99)
GLUCOSE BLDC GLUCOMTR-MCNC: 116 MG/DL — HIGH (ref 70–99)
GLUCOSE BLDC GLUCOMTR-MCNC: 157 MG/DL — HIGH (ref 70–99)
GLUCOSE BLDC GLUCOMTR-MCNC: 157 MG/DL — HIGH (ref 70–99)
GLUCOSE BLDC GLUCOMTR-MCNC: 181 MG/DL — HIGH (ref 70–99)
GLUCOSE BLDC GLUCOMTR-MCNC: 181 MG/DL — HIGH (ref 70–99)
SARS-COV-2 RNA SPEC QL NAA+PROBE: SIGNIFICANT CHANGE UP
SARS-COV-2 RNA SPEC QL NAA+PROBE: SIGNIFICANT CHANGE UP

## 2024-01-03 PROCEDURE — 99231 SBSQ HOSP IP/OBS SF/LOW 25: CPT

## 2024-01-03 RX ORDER — NEBIVOLOL HYDROCHLORIDE 5 MG/1
1 TABLET ORAL
Refills: 0 | DISCHARGE

## 2024-01-03 RX ORDER — MULTIVIT-MIN/FERROUS GLUCONATE 9 MG/15 ML
1 LIQUID (ML) ORAL
Qty: 0 | Refills: 0 | DISCHARGE
Start: 2024-01-03

## 2024-01-03 RX ORDER — CHOLECALCIFEROL (VITAMIN D3) 125 MCG
2000 CAPSULE ORAL
Qty: 0 | Refills: 0 | DISCHARGE
Start: 2024-01-03

## 2024-01-03 RX ORDER — ASCORBIC ACID 60 MG
1 TABLET,CHEWABLE ORAL
Qty: 0 | Refills: 0 | DISCHARGE
Start: 2024-01-03

## 2024-01-03 RX ORDER — METOPROLOL TARTRATE 50 MG
0.5 TABLET ORAL
Qty: 30 | Refills: 0
Start: 2024-01-03 | End: 2024-02-01

## 2024-01-03 RX ADMIN — Medication 500 MILLIGRAM(S): at 12:32

## 2024-01-03 RX ADMIN — Medication 1 TABLET(S): at 12:32

## 2024-01-03 RX ADMIN — Medication 50 MICROGRAM(S): at 05:29

## 2024-01-03 RX ADMIN — Medication 12.5 MILLIGRAM(S): at 17:18

## 2024-01-03 RX ADMIN — ATORVASTATIN CALCIUM 10 MILLIGRAM(S): 80 TABLET, FILM COATED ORAL at 21:36

## 2024-01-03 RX ADMIN — ENOXAPARIN SODIUM 40 MILLIGRAM(S): 100 INJECTION SUBCUTANEOUS at 12:33

## 2024-01-03 RX ADMIN — Medication 1: at 12:33

## 2024-01-03 RX ADMIN — Medication 2000 UNIT(S): at 12:32

## 2024-01-03 RX ADMIN — CLOPIDOGREL BISULFATE 75 MILLIGRAM(S): 75 TABLET, FILM COATED ORAL at 12:32

## 2024-01-03 RX ADMIN — Medication 12.5 MILLIGRAM(S): at 05:30

## 2024-01-03 RX ADMIN — Medication 5 MILLIGRAM(S): at 21:36

## 2024-01-03 NOTE — PROGRESS NOTE ADULT - NUTRITIONAL ASSESSMENT
This patient has been assessed with a concern for Malnutrition and has been determined to have a diagnosis/diagnoses of Moderate protein-calorie malnutrition.    This patient is being managed with:   Diet Regular-  Consistent Carbohydrate {Evening Snack} (CSTCHOSN)  Low Sodium  Supplement Feeding Modality:  Oral  Glucerna Shake Cans or Servings Per Day:  1       Frequency:  Daily  Entered: Dec 31 2023  4:14PM  

## 2024-01-03 NOTE — PROGRESS NOTE ADULT - NSPROGADDITIONALINFOA_GEN_ALL_CORE
time spent reviewing prior charts, meds, discussing plan with patient= 45 min     d/w Medicine ACP Cher
d/w Medicine ACP Cher

## 2024-01-03 NOTE — PROGRESS NOTE ADULT - PROBLEM SELECTOR PLAN 3
On PO meds at home  -Diabetic diet  -Fingersticks and sliding scale  -A1c - 6.2.
On PO meds at home  -Diabetic diet  -Fingersticks and sliding scale  -A1c

## 2024-01-03 NOTE — PROGRESS NOTE ADULT - ASSESSMENT
83F w/ hx of DM2, HTN, HLD, OA p/w weakness and inability to ambulate after fall at home likely with some degree of resolving rhabdo and possible deconditioning. 

## 2024-01-03 NOTE — PROGRESS NOTE ADULT - SUBJECTIVE AND OBJECTIVE BOX
Patient is a 83y old  Female who presents with a chief complaint of Lethargy, weakness (02 Jan 2024 12:35)      SUBJECTIVE / OVERNIGHT EVENTS: PAtient seen and examined at bedside. States that she feels well denies any complaints. No acute events overnight     ROS:  All other review of systems negative    Allergies    No Known Allergies    Intolerances        MEDICATIONS  (STANDING):  ascorbic acid 500 milliGRAM(s) Oral daily  atorvastatin 10 milliGRAM(s) Oral at bedtime  cholecalciferol 2000 Unit(s) Oral daily  clopidogrel Tablet 75 milliGRAM(s) Oral daily  dextrose 5%. 1000 milliLiter(s) (100 mL/Hr) IV Continuous <Continuous>  dextrose 5%. 1000 milliLiter(s) (50 mL/Hr) IV Continuous <Continuous>  dextrose 50% Injectable 25 Gram(s) IV Push once  dextrose 50% Injectable 25 Gram(s) IV Push once  dextrose 50% Injectable 12.5 Gram(s) IV Push once  enoxaparin Injectable 40 milliGRAM(s) SubCutaneous every 24 hours  glucagon  Injectable 1 milliGRAM(s) IntraMuscular once  insulin lispro (ADMELOG) corrective regimen sliding scale   SubCutaneous at bedtime  insulin lispro (ADMELOG) corrective regimen sliding scale   SubCutaneous three times a day before meals  levothyroxine 50 MICROGram(s) Oral daily  melatonin 5 milliGRAM(s) Oral at bedtime  metoprolol tartrate 12.5 milliGRAM(s) Oral two times a day  multivitamin/minerals 1 Tablet(s) Oral daily    MEDICATIONS  (PRN):  acetaminophen     Tablet .. 650 milliGRAM(s) Oral every 6 hours PRN Temp greater or equal to 38C (100.4F), Mild Pain (1 - 3)  dextrose Oral Gel 15 Gram(s) Oral once PRN Blood Glucose LESS THAN 70 milliGRAM(s)/deciliter      Vital Signs Last 24 Hrs  T(C): 36.8 (03 Jan 2024 05:00), Max: 36.8 (02 Jan 2024 19:58)  T(F): 98.2 (03 Jan 2024 05:00), Max: 98.2 (02 Jan 2024 19:58)  HR: 75 (03 Jan 2024 05:00) (75 - 86)  BP: 128/70 (03 Jan 2024 05:00) (112/60 - 129/86)  BP(mean): --  RR: 18 (03 Jan 2024 05:00) (18 - 18)  SpO2: 97% (03 Jan 2024 05:00) (97% - 100%)    Parameters below as of 03 Jan 2024 05:00  Patient On (Oxygen Delivery Method): room air      CAPILLARY BLOOD GLUCOSE      POCT Blood Glucose.: 116 mg/dL (03 Jan 2024 08:23)  POCT Blood Glucose.: 167 mg/dL (02 Jan 2024 21:07)  POCT Blood Glucose.: 114 mg/dL (02 Jan 2024 17:11)  POCT Blood Glucose.: 150 mg/dL (02 Jan 2024 12:08)    I&O's Summary    02 Jan 2024 07:01  -  03 Jan 2024 07:00  --------------------------------------------------------  IN: 1300 mL / OUT: 0 mL / NET: 1300 mL        PHYSICAL EXAM:  GENERAL: elderly  HEAD:  Atraumatic, Normocephalic  EYES: EOMI, PERRLA, conjunctiva and sclera clear  NECK: Supple, No JVD  CHEST/LUNG: Clear to auscultation bilaterally; No wheeze  HEART: Regular rate and rhythm; No murmurs, rubs, or gallops  ABDOMEN: Soft, Nontender, Nondistended; Bowel sounds present  EXTREMITIES:  2+ Peripheral Pulses, No clubbing, cyanosis, or edema  NEUROLOGY: AAOx3, non-focal      LABS:                        10.1   6.45  )-----------( 242      ( 02 Jan 2024 07:25 )             31.4     01-02    139  |  106  |  33<H>  ----------------------------<  130<H>  3.9   |  20<L>  |  1.10    Ca    9.2      02 Jan 2024 07:25            Urinalysis Basic - ( 02 Jan 2024 07:25 )    Color: x / Appearance: x / SG: x / pH: x  Gluc: 130 mg/dL / Ketone: x  / Bili: x / Urobili: x   Blood: x / Protein: x / Nitrite: x   Leuk Esterase: x / RBC: x / WBC x   Sq Epi: x / Non Sq Epi: x / Bacteria: x        RADIOLOGY & ADDITIONAL TESTS:        Case Discussed with niece at bedside

## 2024-01-04 VITALS
SYSTOLIC BLOOD PRESSURE: 118 MMHG | OXYGEN SATURATION: 98 % | RESPIRATION RATE: 18 BRPM | DIASTOLIC BLOOD PRESSURE: 52 MMHG | TEMPERATURE: 97 F | HEART RATE: 85 BPM

## 2024-01-04 LAB
GLUCOSE BLDC GLUCOMTR-MCNC: 170 MG/DL — HIGH (ref 70–99)
GLUCOSE BLDC GLUCOMTR-MCNC: 170 MG/DL — HIGH (ref 70–99)

## 2024-01-04 PROCEDURE — 84439 ASSAY OF FREE THYROXINE: CPT

## 2024-01-04 PROCEDURE — 97116 GAIT TRAINING THERAPY: CPT

## 2024-01-04 PROCEDURE — 93306 TTE W/DOPPLER COMPLETE: CPT

## 2024-01-04 PROCEDURE — 72170 X-RAY EXAM OF PELVIS: CPT

## 2024-01-04 PROCEDURE — 80048 BASIC METABOLIC PNL TOTAL CA: CPT

## 2024-01-04 PROCEDURE — 71045 X-RAY EXAM CHEST 1 VIEW: CPT

## 2024-01-04 PROCEDURE — 97530 THERAPEUTIC ACTIVITIES: CPT

## 2024-01-04 PROCEDURE — 82728 ASSAY OF FERRITIN: CPT

## 2024-01-04 PROCEDURE — 83550 IRON BINDING TEST: CPT

## 2024-01-04 PROCEDURE — 83036 HEMOGLOBIN GLYCOSYLATED A1C: CPT

## 2024-01-04 PROCEDURE — 83880 ASSAY OF NATRIURETIC PEPTIDE: CPT

## 2024-01-04 PROCEDURE — 84145 PROCALCITONIN (PCT): CPT

## 2024-01-04 PROCEDURE — 87077 CULTURE AEROBIC IDENTIFY: CPT

## 2024-01-04 PROCEDURE — 82962 GLUCOSE BLOOD TEST: CPT

## 2024-01-04 PROCEDURE — 87086 URINE CULTURE/COLONY COUNT: CPT

## 2024-01-04 PROCEDURE — 73562 X-RAY EXAM OF KNEE 3: CPT

## 2024-01-04 PROCEDURE — 84484 ASSAY OF TROPONIN QUANT: CPT

## 2024-01-04 PROCEDURE — 72125 CT NECK SPINE W/O DYE: CPT | Mod: MA

## 2024-01-04 PROCEDURE — 70450 CT HEAD/BRAIN W/O DYE: CPT | Mod: MA

## 2024-01-04 PROCEDURE — 97161 PT EVAL LOW COMPLEX 20 MIN: CPT

## 2024-01-04 PROCEDURE — 82607 VITAMIN B-12: CPT

## 2024-01-04 PROCEDURE — 82746 ASSAY OF FOLIC ACID SERUM: CPT

## 2024-01-04 PROCEDURE — 83735 ASSAY OF MAGNESIUM: CPT

## 2024-01-04 PROCEDURE — 83540 ASSAY OF IRON: CPT

## 2024-01-04 PROCEDURE — 85027 COMPLETE CBC AUTOMATED: CPT

## 2024-01-04 PROCEDURE — 81001 URINALYSIS AUTO W/SCOPE: CPT

## 2024-01-04 PROCEDURE — 85025 COMPLETE CBC W/AUTO DIFF WBC: CPT

## 2024-01-04 PROCEDURE — 84481 FREE ASSAY (FT-3): CPT

## 2024-01-04 PROCEDURE — 85610 PROTHROMBIN TIME: CPT

## 2024-01-04 PROCEDURE — 80053 COMPREHEN METABOLIC PANEL: CPT

## 2024-01-04 PROCEDURE — 93005 ELECTROCARDIOGRAM TRACING: CPT

## 2024-01-04 PROCEDURE — 85730 THROMBOPLASTIN TIME PARTIAL: CPT

## 2024-01-04 PROCEDURE — 82550 ASSAY OF CK (CPK): CPT

## 2024-01-04 PROCEDURE — 87635 SARS-COV-2 COVID-19 AMP PRB: CPT

## 2024-01-04 PROCEDURE — 73700 CT LOWER EXTREMITY W/O DYE: CPT

## 2024-01-04 PROCEDURE — 99285 EMERGENCY DEPT VISIT HI MDM: CPT | Mod: 25

## 2024-01-04 PROCEDURE — 87186 SC STD MICRODIL/AGAR DIL: CPT

## 2024-01-04 PROCEDURE — 82306 VITAMIN D 25 HYDROXY: CPT

## 2024-01-04 PROCEDURE — 36415 COLL VENOUS BLD VENIPUNCTURE: CPT

## 2024-01-04 PROCEDURE — 84443 ASSAY THYROID STIM HORMONE: CPT

## 2024-01-04 RX ADMIN — Medication 12.5 MILLIGRAM(S): at 05:06

## 2024-01-04 RX ADMIN — Medication 2000 UNIT(S): at 11:09

## 2024-01-04 RX ADMIN — Medication 500 MILLIGRAM(S): at 11:09

## 2024-01-04 RX ADMIN — Medication 50 MICROGRAM(S): at 05:06

## 2024-01-04 RX ADMIN — Medication 1 TABLET(S): at 11:09

## 2024-01-04 RX ADMIN — Medication 1: at 12:44

## 2024-01-04 RX ADMIN — CLOPIDOGREL BISULFATE 75 MILLIGRAM(S): 75 TABLET, FILM COATED ORAL at 11:09

## 2024-01-04 NOTE — DISCHARGE NOTE NURSING/CASE MANAGEMENT/SOCIAL WORK - PATIENT PORTAL LINK FT
You can access the FollowMyHealth Patient Portal offered by  by registering at the following website: [email or web address]. By joining [facility]’s FollowMyHealth portal, you will also be able to view your health information using other applications (apps) compatible with our system. You can access the FollowMyHealth Patient Portal offered by [facility] by registering at the following website: [email or web address]. By joining [facility]’s FollowMyHealth portal, you will also be able to view your health information using other applications (apps) compatible with our system.

## 2024-01-04 NOTE — CHART NOTE - NSCHARTNOTESELECT_GEN_ALL_CORE
1. To schedule your imaging appointment (mammogram) please call [phone].     2. Send me a message in about 2 1/2 weeks to let me know how the requip is working for your restless legs.   
Hospitalist discharge note
Med Rec
Breast Tenderness

## 2024-01-04 NOTE — DISCHARGE NOTE NURSING/CASE MANAGEMENT/SOCIAL WORK - NSDCFUADDAPPT_GEN_ALL_CORE_FT
APPTS ARE READY TO BE MADE: [X] YES    Best Family or Patient Contact (if needed):    Additional Information about above appointments (if needed):    1: Follow-up with primary care provider.   2: Follow-up with orthopedic doctor, Dr. aKte.  3:     Other comments or requests:     Patient is being discharged to [facility]. Caregiver will arrange follow up.   APPTS ARE READY TO BE MADE: [X] YES    Best Family or Patient Contact (if needed):    Additional Information about above appointments (if needed):    1: Follow-up with primary care provider.   2: Follow-up with orthopedic doctor, Dr. Kate.  3:     Other comments or requests:     Patient is being discharged to [facility]. Caregiver will arrange follow up.

## 2024-01-04 NOTE — CHART NOTE - NSCHARTNOTEFT_GEN_A_CORE
83F w/ hx of DM2, HTN, HLD, OA p/w weakness and inability to ambulate after fall at home likely with some degree of resolving rhabdo and possible deconditioning.     Patient seen and examined with niece at bedside. No acute events overnight     Weakness.   likely deconditioning   -Falls precautions  -PT consult - recs [facility], patient amenable.   -Patient has asymptomatic bacteria, monitor off abx   -F/u T3, T4 - wnl.  -B12 -nl.   - CT L knee-+ severe arthrosis no evidence of hematoma f/u w/ ortho outpt. f/u Dr. Kate outpt     Problem/Plan - 2:  ·  Problem: Fall.   ·  Plan: -Falls precautions  -PT consult - recs [facility].  -Orthostatics - neg.  -Repeat CPK -downtrending.   -TTE: unremarkable   -Cont. plavix for now  -Vitamin D level low at 22. -takes at home. -Vitamin D 2K u daily.  -patient takes: plavix, atorvastatin, metformin, tradjenta, and synthroid.     Problem/Plan - 3:  ·  Problem: Diabetes mellitus.   ·  Plan: On PO meds at home  -Diabetic diet  -Fingersticks and sliding scale  -A1c - 6.2.     Problem/Plan - 4:  ·  Problem: Essential hypertension.   ·  Plan: BP stable  - Metoprolol 12.5 mg BID.     Problem/Plan - 5:  ·  Problem: Hyperlipidemia.   ·  Plan: -Cont. atorvastatin.     Problem/Plan - 6:  ·  Problem: Hypothyroid.   ·  Plan: -Cont. levothyroxine  -TSH slightly elevated, will order Free T3 and T4 - wnl.     Problem/Plan - 7:  ·  Problem: Prophylactic measure.   ·  Plan: DVT PPx  -Lovenox    PAtient is medically stable for d/c to [facility], CM aware. 83F w/ hx of DM2, HTN, HLD, OA p/w weakness and inability to ambulate after fall at home likely with some degree of resolving rhabdo and possible deconditioning.     Patient seen and examined with niece at bedside. No acute events overnight     # Weakness.   likely deconditioning   -Falls precautions  -PT consult - recs [facility], patient amenable.   -Patient has asymptomatic bacteria, monitor off abx   -F/u T3, T4 - wnl.  -B12 -nl.   - CT L knee-+ severe arthrosis no evidence of hematoma f/u w/ ortho outpt. f/u Dr. Kate outpt    #Fall.   -PT consult - recs [facility].  -Orthostatics - neg.  -Repeat CPK -downtrending.   -TTE: unremarkable   -Cont. plavix for now  -Vitamin D level low at 22. -takes at home. -Vitamin D 2K u daily.  -patient takes: plavix, atorvastatin, metformin, tradjenta, and synthroid.    Patient is medically stable for d/c to [facility]  time spent 34min     d/w Medicine HARJEET Morocho 83F w/ hx of DM2, HTN, HLD, OA p/w weakness and inability to ambulate after fall at home likely with some degree of resolving rhabdo and possible deconditioning.     Patient seen and examined with niece at bedside. No acute events overnight     # Weakness.   likely deconditioning   -Falls precautions  -PT consult - recs [facility], patient amenable.   -Patient has asymptomatic bacteria, monitor off abx   -F/u T3, T4 - wnl.  -B12 -nl.   - CT L knee-+ severe arthrosis no evidence of hematoma f/u w/ ortho outpt. f/u Dr. Kate outpt    #Fall.   -PT consult - recs [facility].  -Orthostatics - neg.  -Repeat CPK -downtrending.   -TTE: unremarkable   -Cont. plavix for now  -Vitamin D level low at 22. -takes at home. -Vitamin D 2K u daily.  -patient takes: plavix, atorvastatin, metformin, tradjenta, and synthroid.    Patient is medically stable for d/c to [facility]  time spent 34min     d/w Medicine HARJEET Morocho

## 2024-01-04 NOTE — DISCHARGE NOTE NURSING/CASE MANAGEMENT/SOCIAL WORK - NSDCPEFALRISK_GEN_ALL_CORE
For information on Fall & Injury Prevention, visit: [email or web address] OR  [email or web address] OR  https://www.cdc.gov/steadi/patient.html For information on Fall & Injury Prevention, visit: [email or web address] OR  [email or web address] OR  https://www.cdc.gov/steadi/patient.html

## 2024-01-04 NOTE — PHARMACOTHERAPY INTERVENTION NOTE - COMMENTS
Counseled patient and patient's niece on the following discharge medications names (brand/generic), indication, and possible side effects:    ascorbic acid 500 mg oral tablet: 1 tab(s) orally once a day  atorvastatin 10 mg oral tablet: 1 tab(s) orally once a day (at bedtime)  cholecalciferol oral tablet: 2000 unit(s) orally once a day  clopidogrel 75 mg oral tablet: 1 tab(s) orally once a day  levothyroxine 50 mcg (0.05 mg) oral tablet: 1 tab(s) orally once a day  metFORMIN 500 mg oral tablet: 1 tab(s) orally 2 times a day  Multiple Vitamins with Minerals oral tablet: 1 tab(s) orally once a day  Tradjenta 5 mg oral tablet: 1 tab(s) orally once a day    Patient questions and concerns were answered and addressed. Patient demonstrated understanding.    Patti VelascoD  Transitions of Care Pharmacist  Available on [employer] Teams  Spectra #[phone]     Counseled patient and patient's niece on the following discharge medications names (brand/generic), indication, and possible side effects:    ascorbic acid 500 mg oral tablet: 1 tab(s) orally once a day  atorvastatin 10 mg oral tablet: 1 tab(s) orally once a day (at bedtime)  cholecalciferol oral tablet: 2000 unit(s) orally once a day  clopidogrel 75 mg oral tablet: 1 tab(s) orally once a day  levothyroxine 50 mcg (0.05 mg) oral tablet: 1 tab(s) orally once a day  metFORMIN 500 mg oral tablet: 1 tab(s) orally 2 times a day  Multiple Vitamins with Minerals oral tablet: 1 tab(s) orally once a day  Tradjenta 5 mg oral tablet: 1 tab(s) orally once a day    Patient questions and concerns were answered and addressed. Patient demonstrated understanding.    Patti VelascoD  Transitions of Care Pharmacist  Available on [employer] Teams  Spectra #[phone]

## 2024-08-18 NOTE — DISCHARGE NOTE PROVIDER - NSDCFUADDAPPT_GEN_ALL_CORE_FT
APPTS ARE READY TO BE MADE: [ ] YES    Best Family or Patient Contact (if needed):    Additional Information about above appointments (if needed):    1:   2:   3:     Other comments or requests:    Statement Selected APPTS ARE READY TO BE MADE: [X] YES    Best Family or Patient Contact (if needed):    Additional Information about above appointments (if needed):    1: Follow-up with primary care provider.   2:   3:     Other comments or requests:    APPTS ARE READY TO BE MADE: [X] YES    Best Family or Patient Contact (if needed):    Additional Information about above appointments (if needed):    1: Follow-up with primary care provider.   2: Follow-up with orthopedic doctor, Dr. Kate.  3:     Other comments or requests:    APPTS ARE READY TO BE MADE: [X] YES    Best Family or Patient Contact (if needed):    Additional Information about above appointments (if needed):    1: Follow-up with primary care provider.   2: Follow-up with orthopedic doctor, Dr. Kate.  3:     Other comments or requests:     Patient is being discharged to [facility]. Caregiver will arrange follow up.   APPTS ARE READY TO BE MADE: [X] YES    Best Family or Patient Contact (if needed):    Additional Information about above appointments (if needed):    1: Follow-up with primary care provider.   2: Follow-up with orthopedic doctor, Dr. Kate.  3:     Other comments or requests:     Patient is being discharged to [facility]. Caregiver will arrange follow up.

## 2025-04-18 ENCOUNTER — NON-APPOINTMENT (OUTPATIENT)
Age: 85
End: 2025-04-18

## 2025-04-18 ENCOUNTER — APPOINTMENT (OUTPATIENT)
Age: 85
End: 2025-04-18
Payer: MEDICARE

## 2025-04-18 PROCEDURE — 92202 OPSCPY EXTND ON/MAC DRAW: CPT

## 2025-04-18 PROCEDURE — 92134 CPTRZ OPH DX IMG PST SGM RTA: CPT

## 2025-04-18 PROCEDURE — 92014 COMPRE OPH EXAM EST PT 1/>: CPT
